# Patient Record
Sex: FEMALE | Race: WHITE | NOT HISPANIC OR LATINO | URBAN - METROPOLITAN AREA
[De-identification: names, ages, dates, MRNs, and addresses within clinical notes are randomized per-mention and may not be internally consistent; named-entity substitution may affect disease eponyms.]

---

## 2017-07-28 ENCOUNTER — APPOINTMENT (OUTPATIENT)
Dept: LACTATION | Facility: HOSPITAL | Age: 37
End: 2017-07-28

## 2023-05-03 ENCOUNTER — OFFICE VISIT (OUTPATIENT)
Dept: UROLOGY | Facility: CLINIC | Age: 43
End: 2023-05-03
Payer: COMMERCIAL

## 2023-05-03 DIAGNOSIS — N39.0 RECURRENT UTI: Primary | ICD-10-CM

## 2023-05-03 LAB
BILIRUB BLD-MCNC: NEGATIVE MG/DL
CLARITY, POC: CLEAR
COLOR UR: YELLOW
EXPIRATION DATE: NORMAL
GLUCOSE UR STRIP-MCNC: NEGATIVE MG/DL
KETONES UR QL: NEGATIVE
LEUKOCYTE EST, POC: NEGATIVE
Lab: NORMAL
NITRITE UR-MCNC: NEGATIVE MG/ML
PH UR: 6 [PH] (ref 5–8)
PROT UR STRIP-MCNC: NEGATIVE MG/DL
RBC # UR STRIP: NEGATIVE /UL
SP GR UR: 1.03 (ref 1–1.03)
UROBILINOGEN UR QL: NORMAL

## 2023-05-03 RX ORDER — METHENAMINE HIPPURATE 1000 MG/1
1 TABLET ORAL 2 TIMES DAILY WITH MEALS
Qty: 90 TABLET | Refills: 2 | Status: SHIPPED | OUTPATIENT
Start: 2023-05-03

## 2023-05-03 NOTE — PATIENT INSTRUCTIONS
METHANAMINE HIPPURATE FOR UTI PREVENTION - INFORMATION SHEET     WHAT ARE HIPREX® (methenamine hippurate tablets USP)?  Hiprex® (methenamine hippurate tablets USP) are used to prevent or control recurring urinary tract infections caused by certain bacteria. Hiprex is not used to treat an active infection; Hiprex should only be used after the infection has been treated with other antibiotics.    Hiprex is an antibiotic that stops the growth of bacteria in urine. Hiprex also contains an ingredient that helps to make the urine acidic. When the urine is acidic, Hiprex turns into formaldehyde to kill the bacteria.    You should not take Hiprex unless your doctor has confirmed that your infection is caused by bacteria that is susceptible to Hiprex. Hiprex is effective only against bacterial infections in the urinary tract. It will not work for other types of bacterial infections or for viral infections (such as the common cold or flu). Unnecessary use of Hiprex can lead to its decreased effectiveness and the development of antibiotic-resistant bacteria.    WHAT CONDITIONS ARE TREATED WITH HIPREX TABLETS?  Hiprex is used to prevent or control returning urinary tract infections caused by certain bacteria. It is not used to treat an active infection. Other antibiotics must be used first to treat and cure the infection.    WHAT IS HIPREX USED FOR?  Hiprex® (methenamine hippurate tablets USP) are used to prevent or control recurring urinary tract infections caused by certain bacteria. Hiprex is not used to treat an active infection; Hiprex should only be used after the infection has been treated with other antibiotics.    WHEN SHOULD I NOT TAKE THE DRUG?  You should not take Hiprex if you are allergic to methenamine hippurate, formaldehyde, or any of the other ingredients in the drug, or if you are taking a sulfonamide drug.    You should not take Hiprex unless your doctor has confirmed that your infection is caused by  bacteria that is susceptible to Hiprex. Hiprex is effective only against bacterial infections in the urinary tract. It will not work for other types of bacterial infections or for viral infections (such as the common cold or flu). Unnecessary use of Hiprex can lead to its decreased effectiveness and the development of antibiotic-resistant bacteria.    You should not take Hiprex if you have kidney failure, severe liver insufficiency, or you are severely dehydrated.    WHAT WARNINGS SHOULD I KNOW ABOUT HIPREX?  Large doses of Hiprex (8 pills/day for 3-4 weeks) can cause bladder irritation, painful and frequent urination, cause protein to leak into the urine, and cause blood in the urine.    You should also be aware that taking Hiprex before a bacterial infection has been confirmed increases the risk of developing drug resistant bacteria.    When taking Hiprex:    You should take every precaution to maintain an acid pH of your urine  If you have liver dysfunction, you should periodically have liver function tests  Safe use in early pregnancy has not been established. In the last trimester of your pregnancy, safety has been suggested, but has not been proven  Hiprex contains FD&C Yellow No. 5 (tartazine) dye, which can cause bronchial asthma  Hiprex should only be used to treat bacterial infections. Hiprex will not treat a viral infection (such as the common cold)

## 2023-05-03 NOTE — PROGRESS NOTES
"       Office Visit New Urology      Patient Name: Monica Condon  : 1980   MRN: [id number]     Chief Complaint:    Chief Complaint   Patient presents with   • Recurrent UTI       Referring Provider: Yasmin Ren AP*    History of Present Illness: Monica Condon is a 42 y.o. female who presents to Urology today for evaluation of recurrent UTIs. States when she first  had frequent UTIs with intercourse. Was placed on post coital abx.     Has been having intermittent UTIs, \"a few per year.\"    Minimal records were sent by her primary care provider's office, only note from 2022 at which point she had some dysuria and was diagnosed with UTI.    No other culture results are available.    E coli UTI in 2022.     States she is on antibiotic Bactrim for current UTI. Thinks E coli.     UTI symptoms include back pain and abdominal pain, denies dysuria, reports \"different smelling urine\".     On Metformin for weight loss, previously lost 80 lbs, recently put back on 15.     Drinks Medrano's coke daily.  Does not drink enough water likely.     Denies difficulty urinating.    Urinalysis entirely negative today.    Subjective      Review of System: Review of Systems   Genitourinary: Negative for decreased urine volume, difficulty urinating, dysuria, enuresis, flank pain, frequency, hematuria and urgency.      I have reviewed the ROS documented by my clinical staff, I have updated appropriately and I agree. Lamin Montoya MD    Past Medical History: History reviewed. No pertinent past medical history.    Past Surgical History: History reviewed. No pertinent surgical history.    Family History: History reviewed. No pertinent family history.    Social History:   Social History     Socioeconomic History   • Marital status:        Medications:     Current Outpatient Medications:   •  methenamine (HIPREX) 1 g tablet, Take 1 tablet by mouth 2 (Two) Times a Day With Meals., Disp: 90 " tablet, Rfl: 2    Allergies:   Not on File      Objective     Physical Exam:   Vital Signs: There were no vitals filed for this visit.  There is no height or weight on file to calculate BMI.     Physical Exam  Constitutional:       Appearance: Normal appearance.   HENT:      Head: Normocephalic and atraumatic.      Nose: Nose normal.      Mouth/Throat:      Mouth: Mucous membranes are moist.      Pharynx: Oropharynx is clear.   Eyes:      Extraocular Movements: Extraocular movements intact.      Pupils: Pupils are equal, round, and reactive to light.   Pulmonary:      Effort: Pulmonary effort is normal. No respiratory distress.   Musculoskeletal:         General: No swelling or deformity. Normal range of motion.      Cervical back: Normal range of motion and neck supple.   Skin:     General: Skin is warm and dry.   Neurological:      General: No focal deficit present.      Mental Status: She is alert and oriented to person, place, and time. Mental status is at baseline.   Psychiatric:         Mood and Affect: Mood normal.         Behavior: Behavior normal.         Labs:   Brief Urine Lab Results  (Last result in the past 365 days)      Color   Clarity   Blood   Leuk Est   Nitrite   Protein   CREAT   Urine HCG        05/03/23 1505 Yellow   Clear   Negative   Negative   Negative   Negative                      No results found for: GLUCOSE, CALCIUM, NA, K, CO2, CL, BUN, CREATININE, EGFRIFAFRI, EGFRIFNONA, BCR, ANIONGAP    No results found for: WBC, HGB, HCT, MCV, PLT    Images:   No Images in the past 120 days found..    Measures:   Tobacco:   Monica Condon  has no history on file for tobacco use.         Assessment / Plan      Assessment/Plan:   Monica Condon is a 42 y.o. female who presented today for recurrent urinary tract infection.  Risk factors include obesity and significant Coca-Cola intake daily.  He is to increase her water intake, perform timed double voiding, discussed UTI prevention.  Discussed  self start antibiotic therapy with a standing urine culture order, postcoital antibiotics if associated with intercourse or daily prevention to include antibiotic versus nonantibiotic prevention.  She is interested methenamine hippurate prevention daily.  Discussed using this with a vitamin C tablet for best results.  See her back in 3 months for evaluation.  Risks of medication discussed.    Diagnoses and all orders for this visit:    1. Recurrent UTI (Primary)  -     POC Urinalysis Dipstick, Automated  -     methenamine (HIPREX) 1 g tablet; Take 1 tablet by mouth 2 (Two) Times a Day With Meals.  Dispense: 90 tablet; Refill: 2            Follow Up:   Return in about 3 months (around 8/3/2023).    I spent approximately 30 minutes providing clinical care for this patient; including review of patient's chart and provider documentation, face to face time spent with patient in examination room (obtaining history, performing physical exam, discussing diagnosis and management options), placing orders, and completing patient documentation.     Lamin Montoya MD  [facility] [place]

## 2023-08-28 ENCOUNTER — OFFICE VISIT (OUTPATIENT)
Dept: UROLOGY | Facility: CLINIC | Age: 43
End: 2023-08-28
Payer: COMMERCIAL

## 2023-08-28 VITALS
HEIGHT: 68 IN | WEIGHT: 264 LBS | BODY MASS INDEX: 40.01 KG/M2 | OXYGEN SATURATION: 92 % | SYSTOLIC BLOOD PRESSURE: 126 MMHG | HEART RATE: 62 BPM | DIASTOLIC BLOOD PRESSURE: 78 MMHG

## 2023-08-28 DIAGNOSIS — N39.0 RECURRENT UTI: Primary | ICD-10-CM

## 2023-08-28 LAB
BILIRUB BLD-MCNC: NEGATIVE MG/DL
CLARITY, POC: CLEAR
COLOR UR: YELLOW
EXPIRATION DATE: NORMAL
GLUCOSE UR STRIP-MCNC: NEGATIVE MG/DL
KETONES UR QL: NEGATIVE
LEUKOCYTE EST, POC: NEGATIVE
Lab: NORMAL
NITRITE UR-MCNC: NEGATIVE MG/ML
PH UR: 6 [PH] (ref 5–8)
PROT UR STRIP-MCNC: NEGATIVE MG/DL
RBC # UR STRIP: NEGATIVE /UL
SP GR UR: 1.02 (ref 1–1.03)
UROBILINOGEN UR QL: NORMAL

## 2023-08-28 RX ORDER — CLEMASTINE FUMARATE 2.68 MG
2.68 TABLET ORAL DAILY
COMMUNITY

## 2023-08-28 RX ORDER — SERTRALINE HYDROCHLORIDE 100 MG/1
100 TABLET, FILM COATED ORAL
COMMUNITY

## 2023-08-28 RX ORDER — IBUPROFEN 800 MG/1
800 TABLET ORAL
COMMUNITY

## 2023-08-28 RX ORDER — FAMOTIDINE 20 MG/1
20 TABLET, FILM COATED ORAL
COMMUNITY

## 2023-08-28 RX ORDER — EPINEPHRINE 0.3 MG/.3ML
INJECTION SUBCUTANEOUS
COMMUNITY
Start: 2015-09-17

## 2023-08-28 RX ORDER — METOPROLOL SUCCINATE 100 MG/1
100 TABLET, EXTENDED RELEASE ORAL
COMMUNITY

## 2023-08-28 RX ORDER — DICYCLOMINE HYDROCHLORIDE 10 MG/1
10 CAPSULE ORAL
COMMUNITY

## 2023-08-28 RX ORDER — BUSPIRONE HYDROCHLORIDE 7.5 MG/1
TABLET ORAL
COMMUNITY
Start: 2023-08-25

## 2023-08-28 NOTE — PROGRESS NOTES
Follow Up Office Visit      Patient Name: Monica Condon  : 1980   MRN: [id number]     Chief Complaint:    Chief Complaint   Patient presents with    Recurrent UTI       Referring Provider: No ref. provider found    History of Present Illness: Monica Condon is a 42 y.o. female who presents today for follow up of recurrent UTI.  Last seen in May.  Placed on methenamine hippurate for concern for recurrent UTI.  Has been drinking 1 cup of coffee daily and 1 coca cola, large Medrano's cup daily.  She is trying to stop drinking soda but having trouble.  She has been on methenamine hippurate for the last 3 months with no UTI symptoms.  She denies side effects associated with the medication.  Today, she reports low pelvic pain but denies dysuria.  Her urinalysis is entirely negative.  Very mildly elevated 84 mL, she feels like she is emptying her bladder completely.      Subjective      Review of System: Review of Systems   Constitutional:  Positive for fatigue.   Cardiovascular:  Positive for palpitations.   Genitourinary:  Positive for frequency and urgency.    I have reviewed the ROS documented by my clinical staff, I have updated appropriately and I agree. Lamin Montoya MD    I have reviewed and the following portions of the patient's history were updated as appropriate: past family history, past medical history, past social history, past surgical history and problem list.    Medications:     Current Outpatient Medications:     busPIRone (BUSPAR) 7.5 MG tablet, , Disp: , Rfl:     clemastine (TAVIST) 2.68 MG tablet, Take 1 tablet by mouth Daily., Disp: , Rfl:     Diclofenac Sodium (VOLTAREN) 1 % gel gel, , Disp: , Rfl:     dicyclomine (BENTYL) 10 MG capsule, Take 1 capsule by mouth., Disp: , Rfl:     EPINEPHrine (EPIPEN) 0.3 MG/0.3ML solution auto-injector injection, EpiPen 2-Raciel 0.3 mg/0.3 mL injection, auto-injector  As Directed, Disp: , Rfl:     famotidine (PEPCID) 20 MG tablet, Take 1  "tablet by mouth., Disp: , Rfl:     ibuprofen (ADVIL,MOTRIN) 800 MG tablet, Take 1 tablet by mouth., Disp: , Rfl:     metFORMIN (GLUCOPHAGE) 1000 MG tablet, Take 1 tablet by mouth., Disp: , Rfl:     metoprolol succinate XL (TOPROL-XL) 100 MG 24 hr tablet, Take 1 tablet by mouth., Disp: , Rfl:     sertraline (ZOLOFT) 100 MG tablet, Take 1 tablet by mouth., Disp: , Rfl:     Allergies:   No Known Allergies    Bladder & Bowel Symptom Questionnaire    How often do you usually urinate during the day ?   1 - About every 3-4 hours   2.   How many timed do you urinate at night?   0 - No more often than once in 4 hours    3.   What is the reason that you usually urinate?   2 - About every 2-3 hours    4.   Once you get the urge to go, how long can you     comfortably delay?   2 - About every 2-3 hours    5.   How often do you get a sudden urge that makes you rush to the bathroom?   3 - About every 1-2 hours   6.   How often does a sudden urge to urinate result in you leaking urine or wetting pads?   1 - About every 3-4 hours   7.  In your opinion, how good is your bladder control?   2 - About every 2-3 hours    8.  Do you have accidental bowel leakage?   no   9.  Do you have difficulty fully emptying your bladder?   no   10.  Do you experience accidental leakage when performing some physical activity such as coughing, sneezing, laughing or exercise?   yes   11. Have you tried medications to help improve your symptoms?   no   12. Would you be interested in learning about a long-lasting option that may help you with your symptoms?   yes                                                                             Total Score   11     0-7 (Mild) 8-16 (Moderate) 17-28 (Severe)        Post void residual bladder scan:   84 mL     Objective     Physical Exam:   Vital Signs:   Vitals:    08/28/23 0932   BP: 126/78   Pulse: 62   SpO2: 92%   Weight: 120 kg (264 lb)   Height: 172.7 cm (68\")     Body mass index is 40.14 kg/mý.     Physical " "Exam    Labs:   Brief Urine Lab Results  (Last result in the past 365 days)        Color   Clarity   Blood   Leuk Est   Nitrite   Protein   CREAT   Urine HCG        08/28/23 0953 Yellow   Clear   Negative   Negative   Negative   Negative                        No results found for: GLUCOSE, CALCIUM, NA, K, CO2, CL, BUN, CREATININE, EGFRIFAFRI, EGFRIFNONA, BCR, ANIONGAP    No results found for: WBC, HGB, HCT, MCV, PLT    Images:   No Images in the past 120 days found..    Measures:   Tobacco:   Monica Condon  reports that she has never smoked. She does not have any smokeless tobacco history on file.    Assessment / Plan      Assessment/Plan:   42 y.o. female who presented today for follow up of recurrent UTI.  No UTI concerns in the last 3 months.  She completed 3 months of urinary sterilization.  She feels like she has UTI today, she also reports she has \"bulging disks in her neck and back\" and has been told that some of this pain in her pelvis may be a referred pain.  She denies dysuria.  We will send urine for resolve MDX urinary genetic testing, if positive for bacteria we will call her with antibiotic course.  Otherwise she does not need any further follow-up.  She needs to work on increasing her water intake, reducing her caffeine and soda intake, focusing on timed and double voiding to ensure emptying.  Follow-up as needed.    Diagnoses and all orders for this visit:    1. Recurrent UTI (Primary)  -     POC Urinalysis Dipstick, Automated           Follow Up:   Return if symptoms worsen or fail to improve.    I spent approximately 20 minutes providing clinical care for this patient; including review of patient's chart and provider documentation, face to face time spent with patient in examination room (obtaining history, performing physical exam, discussing diagnosis and management options), placing orders, and completing patient documentation.     Lamin Montoya MD  [facility] [place]   "

## 2023-09-01 NOTE — PROGRESS NOTES
Please let Monica know her urine culture/resolve MDX came back negative for any bacteria.  Thank you

## 2024-08-09 ENCOUNTER — TELEPHONE (OUTPATIENT)
Dept: UROLOGY | Facility: CLINIC | Age: 44
End: 2024-08-09
Payer: COMMERCIAL

## 2024-08-09 NOTE — TELEPHONE ENCOUNTER
HUB CALLED B/C PATIENT THINKS SHE HAS A UTI, EVEN THOUGH PRIMARY CARE SAID SHE DIDN'T AND DIDN'T FINISH THE MEDICATION. PATIENT MAY DROP OFF SAMPLE.

## 2024-08-13 RX ORDER — MECOBALAMIN 5000 MCG
TABLET,DISINTEGRATING ORAL 2 TIMES DAILY
COMMUNITY

## 2024-08-20 ENCOUNTER — OFFICE VISIT (OUTPATIENT)
Dept: BARIATRICS/WEIGHT MGMT | Facility: CLINIC | Age: 44
End: 2024-08-20
Payer: COMMERCIAL

## 2024-08-20 ENCOUNTER — DOCUMENTATION (OUTPATIENT)
Dept: BARIATRICS/WEIGHT MGMT | Facility: CLINIC | Age: 44
End: 2024-08-20
Payer: COMMERCIAL

## 2024-08-20 ENCOUNTER — OFFICE VISIT (OUTPATIENT)
Dept: BEHAVIORAL HEALTH | Facility: CLINIC | Age: 44
End: 2024-08-20
Payer: COMMERCIAL

## 2024-08-20 VITALS
HEART RATE: 67 BPM | OXYGEN SATURATION: 98 % | BODY MASS INDEX: 42.94 KG/M2 | TEMPERATURE: 97.8 F | RESPIRATION RATE: 16 BRPM | HEIGHT: 67 IN | SYSTOLIC BLOOD PRESSURE: 130 MMHG | WEIGHT: 273.6 LBS | DIASTOLIC BLOOD PRESSURE: 84 MMHG

## 2024-08-20 DIAGNOSIS — E66.01 MORBID OBESITY WITH BMI OF 40.0-44.9, ADULT: Primary | ICD-10-CM

## 2024-08-20 DIAGNOSIS — I10 HYPERTENSION, UNSPECIFIED TYPE: ICD-10-CM

## 2024-08-20 DIAGNOSIS — Z71.89 ENCOUNTER FOR PSYCHOLOGICAL ASSESSMENT PRIOR TO BARIATRIC SURGERY: ICD-10-CM

## 2024-08-20 DIAGNOSIS — E66.01 OBESITY, CLASS III, BMI 40-49.9 (MORBID OBESITY): Primary | ICD-10-CM

## 2024-08-20 DIAGNOSIS — Z86.59 HISTORY OF ANXIETY: ICD-10-CM

## 2024-08-20 DIAGNOSIS — K21.9 GASTROESOPHAGEAL REFLUX DISEASE, UNSPECIFIED WHETHER ESOPHAGITIS PRESENT: ICD-10-CM

## 2024-08-20 DIAGNOSIS — K82.9 GALLBLADDER DISEASE: ICD-10-CM

## 2024-08-20 DIAGNOSIS — R53.83 FATIGUE, UNSPECIFIED TYPE: ICD-10-CM

## 2024-08-20 PROCEDURE — 1159F MED LIST DOCD IN RCRD: CPT | Performed by: PSYCHOLOGIST

## 2024-08-20 PROCEDURE — 1160F RVW MEDS BY RX/DR IN RCRD: CPT | Performed by: PSYCHOLOGIST

## 2024-08-20 PROCEDURE — 90791 PSYCH DIAGNOSTIC EVALUATION: CPT | Performed by: PSYCHOLOGIST

## 2024-08-20 PROCEDURE — 3079F DIAST BP 80-89 MM HG: CPT | Performed by: PHYSICIAN ASSISTANT

## 2024-08-20 PROCEDURE — 99204 OFFICE O/P NEW MOD 45 MIN: CPT | Performed by: PHYSICIAN ASSISTANT

## 2024-08-20 PROCEDURE — 3075F SYST BP GE 130 - 139MM HG: CPT | Performed by: PHYSICIAN ASSISTANT

## 2024-08-20 RX ORDER — METFORMIN HYDROCHLORIDE 500 MG/1
500 TABLET, EXTENDED RELEASE ORAL
COMMUNITY

## 2024-08-20 RX ORDER — POTASSIUM CHLORIDE 750 MG/1
10 TABLET, FILM COATED, EXTENDED RELEASE ORAL DAILY
COMMUNITY

## 2024-08-20 RX ORDER — MULTIPLE VITAMINS W/ MINERALS TAB 9MG-400MCG
2 TAB ORAL DAILY
COMMUNITY

## 2024-08-20 NOTE — PROGRESS NOTES
PROGRESS NOTE    Data:    Monica Condon is a 43 y.o. female who met with the undersigned for a scheduled psychological evaluation from 11:15 am - 12:00 pm.      Clinical Maneuvering/Intervention:      Chief complaint and history of presenting illness/Problems: struggling with obesity for several years. Despite trying different weight loss plans and diets, the pt reported being unsuccessful in losing weight. A psychological evaluation was conducted in order to assess past and current level of functioning. Areas assessed included, but were not limited to: perception of social support, perception of ability to face and deal with challenges in life (positive functioning), anxiety symptoms, depressive symptoms, perspective on beliefs/belief system, coping skills for stress, intelligence level, addiction issues, etc. Therapeutic rapport was established. Interventions conducted today were geared towards assessing the pt's readiness for weight loss surgery and identifying and psychological contraindications for undergoing such a major life change. Social support was deemed strong (specific to weight loss surgery/weight loss in this manner and in a general sense): partner, friends, family members.Current psychological struggles were described as low, but included anxiety (per pt, well-managed with medication) and frustrations with being overweight. Coping skills for distress and related to undergoing a major life change such as weight loss surgery/weight loss were deemed strong and included choosing to see good in life,  finds humor in things, makes a point to do quality work, tends to be a responsible person, maintains quality relationships with others, and makes a point to learn what will help her be successful with weight loss surgery. The pt endorsed having characteristics of readiness to undergo major life changes inherent in the journey of weight loss surgery. She could speak to having 'suffered enough,' and the  decision to have weight loss surgery has 'clicked' for her. The pt expressed gratitude for today's visit.     Past Family and Social History:      History of family mental health problems: none endorsed    Psychosocial history: treatment of psychiatric care in the past (N/A), alcohol/substance abuse treatment in the past (N/A) , alcohol/substance abuse problems (N/A), inpatient psychiatric care (N/A).    Mental Status Exam (MSE):  Hygiene:  good  Dress: normal  Attitude:  cooperative and proactive  Motor Activity: normal  Speech: normal  Mood:   excited  Affect:  congruent  Thought Processes: normal  Thought Content:  normal  Suicidal Thoughts:  not endorsed  Homicidal Thoughts:  not endorsed  Crisis Safety Plan: not needed   Hallucinations:  none      Patient's Support Network Includes:  family, friends      Progress toward goal: there is evidence to suggest that she is taking measures to improve the quality of her life including seeking weight loss surgery      Functional Status: high      Prognosis: good with weight loss surgery    Evaluation, Diagnoses, and Ability/Capacity to Respond to Treatment:      The pt presented to be struggling with frustrations with being overweight (BMI = 43.50, morbid obesity). She has a history of anxiety problem, but medication seems quite helpful in managing symptoms. The pt presented with good mental health; results of MSE demonstrated a functional status of high. Strengths: belief in self that she will be successful with weight loss surgery, etc (see detailed list of coping skills above). Needed for growth (CPT code requirement for Weaknesses): weight loss.      From a psychological standpoint, the pt presents as a good candidate for bariatric surgery. She is motivated for the surgery, has showed readiness for the lifestyle change in terms of starting to adjust her eating habits, and seems to have appropriate expectations of how to prepare and how to live after surgery in order  to lose weight successfully.    Treatment Plan:      Short term goals: Start improving her health by following up with her bariatric surgeon in order to receive weight loss surgery as soon as feasible/appropriate and demonstrate success with compliance to adhering to the recommended diet. Long term goals: reach a healthy weight and experience overall improved mood via taking control over her health.    Karla Buck, PhD, LP

## 2024-08-20 NOTE — PROGRESS NOTES
[facility] BARIATRIC SURGERY  [address]    [facility] [address]  [phone]      Patient  Name:  Monica Condon  :  1980      Date of Visit: 2024      Chief Complaint:  weight gain; unable to maintain weight loss      History of Present Illness:  Monica Condon is a 43 y.o. female who presents today for evaluation, education and consultation regarding metabolic and bariatric surgery with Dr. Kendall.     Monica has been overweight for at least 30 years, has been 35 pounds or more overweight for at least 30 years, has been 100 pounds or more overweight for 25 or more years and started dieting at age 16.  Previous diet attempts include: High Protein, Low Carbohydrate, Low Fat, Calorie Counting, Venkatesh's Diet, [place], Cabbage Soup, Fasting, and Slim Fast; [place] Weight Loss and Weight Watchers.  The most weight Monica lost was 80 pounds but was unable to maintain that weight loss.  Her maximum lifetime weight is 330 pounds.       Complete history has been obtained and discussed today, as pertinent to metabolic/ bariatric surgery.     Past Medical History:   Diagnosis Date    Anxiety     Dyspepsia     Dysphagia     Fatigue     Frequent UTI     pending Urology eval    Gallbladder disease     abnormal imaging    GERD (gastroesophageal reflux disease)     controlled w/ daily PPI + Pepcid, denies recent eval    Hypertension     IBS (irritable bowel syndrome)     Joint pain     Morbid obesity      Past Surgical History:   Procedure Laterality Date    COLONOSCOPY  2017    DILATATION AND CURETTAGE  2016       Allergies   Allergen Reactions    Lidocaine Swelling    Shellfish-Derived Products Swelling       Current Outpatient Medications:     busPIRone (BUSPAR) 7.5 MG tablet, Take 1 tablet by mouth 2 (Two) Times a Day., Disp: , Rfl:     clemastine (TAVIST) 2.68 MG tablet, Take 1 tablet by mouth Daily., Disp: , Rfl:     dicyclomine (BENTYL) 10 MG capsule, Take 1  capsule by mouth 2 (Two) Times a Day., Disp: , Rfl:     famotidine (PEPCID) 40 MG tablet, Take 0.5 tablets by mouth 2 (Two) Times a Day., Disp: , Rfl:     ibuprofen (ADVIL,MOTRIN) 800 MG tablet, Take 1 tablet by mouth 2 (Two) Times a Day., Disp: , Rfl:     lansoprazole (PREVACID) 30 MG capsule, Take 1 capsule by mouth 2 (Two) Times a Day., Disp: , Rfl:     metFORMIN ER (GLUCOPHAGE-XR) 500 MG 24 hr tablet, Take 1 tablet by mouth Daily With Breakfast., Disp: , Rfl:     metoprolol succinate XL (TOPROL-XL) 100 MG 24 hr tablet, Take 1 tablet by mouth 2 (Two) Times a Day., Disp: , Rfl:     multivitamin with minerals tablet tablet, Take 2 tablets by mouth Daily., Disp: , Rfl:     potassium chloride 10 MEQ CR tablet, Take 1 tablet by mouth Daily., Disp: , Rfl:     sertraline (ZOLOFT) 100 MG tablet, Take 2 tablets by mouth 2 (Two) Times a Day., Disp: , Rfl:     EPINEPHrine (EPIPEN) 0.3 MG/0.3ML solution auto-injector injection, EpiPen 2-Raciel 0.3 mg/0.3 mL injection, auto-injector  As Directed (Patient not taking: Reported on 8/20/2024), Disp: , Rfl:     Social History     Socioeconomic History    Marital status:    Tobacco Use    Smoking status: Never   Vaping Use    Vaping status: Never Used   Substance and Sexual Activity    Alcohol use: Not Currently    Drug use: Never    Sexual activity: Yes     Social History     Social History Narrative    Lives in [place], [place] w/ partner and 5 children.  Self-employed, cleaning business.       Family History   Problem Relation Age of Onset    Diabetes Mother 70    Hypertension Mother 60    Heart attack Mother 80    Diabetes Father 60    Hypertension Father 60    Heart attack Father 75    Obesity Sister     Diabetes Sister     Lung cancer Maternal Grandfather        Review of Systems:  Constitutional:  reports fatigue, weight gain and denies fevers, chills.  HEENT:  denies headache, ear pain or loss of hearing, blurred or double vision, nasal discharge or sore  throat.  Cardiovascular:  reports HTN and denies hx heart disease, chest pain, hx DVT.  Respiratory:  denies cough , wheezing, sleep apnea, asthma, hx PE.  Gastrointestinal:  reports dysphagia, heartburn, IBS, gallbladder issues (abnormal imaging) and denies nausea, vomiting, liver disease.  Genitourinary:   denies incontinence, hematuria, dysuria, polyuria, polydipsia, renal insufficiency.    Musculoskeletal:   denies fibromyalgia, arthritis, and autoimmune disease.  Neurological:  denies numbness /tingling, dizziness, confusion, seizure.  Psychiatric:  reports hx anxiety and denies depressed mood, bipolar disorder.  Endocrine:   denies diabetes, thyroid disease.  Hematologic:   denies bruising, bleeding disorder, hx anemia, hx blood transfusion.  Skin:   denies rashes, hx MRSA.    Physical Exam:  Vital Signs:  Weight: 124 kg (273 lb 9.6 oz)   Body mass index is 43.5 kg/m².  Temp: 97.8 °F (36.6 °C)   Heart Rate: 67   BP: 130/84     Physical Exam  Vitals reviewed.   Constitutional:       Appearance: She is well-developed.   HENT:      Head: Normocephalic and atraumatic.   Eyes:      General: No scleral icterus.     Conjunctiva/sclera: Conjunctivae normal.   Neck:      Thyroid: No thyromegaly.   Cardiovascular:      Rate and Rhythm: Normal rate and regular rhythm.      Heart sounds: No murmur heard.  Pulmonary:      Effort: Pulmonary effort is normal. No respiratory distress.      Breath sounds: Normal breath sounds. No wheezing or rales.   Abdominal:      General: Bowel sounds are normal. There is no distension.      Palpations: Abdomen is soft. There is no mass.      Tenderness: There is no abdominal tenderness.      Hernia: No hernia is present.      Comments: no surgical scars   Musculoskeletal:         General: Normal range of motion.      Cervical back: Neck supple.   Skin:     General: Skin is warm and dry.      Findings: No rash.   Neurological:      Mental Status: She is alert and oriented to person, place,  and time.      Gait: Gait normal.   Psychiatric:         Judgment: Judgment normal.         Patient Active Problem List   Diagnosis    IBS (irritable bowel syndrome)    Morbid obesity    Fatigue    Dyspepsia    Anxiety    Hypertension    GERD (gastroesophageal reflux disease)    Joint pain    Dysphagia    Frequent UTI    Gallbladder disease       Assessment:  43 y.o. female with medically complicated obesity pursuing sleeve gastrectomy.    Metabolic & Bariatric Surgery is deemed medically necessary given the following: Class 3 Severe Obesity (BMI >=40). Obesity-related health conditions include the following: hypertension and GERD. Obesity is worsening. BMI is is above average; BMI management plan is completed. We discussed consulting a Bariatric surgeon.        Plan:  Further evaluation will include: CBC, CMP, Lipids, TSH, HgA1C, H.Pylori, Gallbladder Eval, and EGD.    No additional clearances needed prior to surgery at this time.     Patient understands that bariatric surgery is not cosmetic surgery but rather a tool to help make a lifelong commitment to lifestyle changes including diet, exercise and behavior modifications.  The patient has been educated today on those expected postoperative lifestyle changes.  Psychological and Nutritional consultations will be completed prior to surgery.  Instructions on how to access [employer] (an internet based site w/ educational surgical videos) were given to the patient.  Recommended perioperative vitamin supplementation was reviewed.  The importance of avoiding ASA/ NSAIDS/ steroids/ tobacco/nicotine/ hormones/ immunomodulators perioperatively was discussed in detail.  All questions/concerns have been addressed.      Further input to follow pending the above.           KOURTNEY Soares

## 2024-08-20 NOTE — PROGRESS NOTES
"Weight Loss Surgery  Presurgical Nutrition Assessment     Monica Condon  08/20/2024  [id number]  [id number]  1980   female    Surgery desired: LSG (sleeve gastrectomy)     HEIGHT: 168.9 cm (66.5\")   WEIGHT: 124 kg (273.5 #)   BMI: 43.5    Highest weight ever: 150 kg (330 #)      Allergies   Allergen Reactions    Lidocaine Swelling    Shellfish-Derived Products Swelling       Current Outpatient Medications:     busPIRone (BUSPAR) 7.5 MG tablet, Take 1 tablet by mouth 2 (Two) Times a Day., Disp: , Rfl:     clemastine (TAVIST) 2.68 MG tablet, Take 1 tablet by mouth Daily., Disp: , Rfl:     dicyclomine (BENTYL) 10 MG capsule, Take 1 capsule by mouth 2 (Two) Times a Day., Disp: , Rfl:     EPINEPHrine (EPIPEN) 0.3 MG/0.3ML solution auto-injector injection, EpiPen 2-Raciel 0.3 mg/0.3 mL injection, auto-injector  As Directed (Patient not taking: Reported on 8/20/2024), Disp: , Rfl:     famotidine (PEPCID) 40 MG tablet, Take 0.5 tablets by mouth 2 (Two) Times a Day., Disp: , Rfl:     ibuprofen (ADVIL,MOTRIN) 800 MG tablet, Take 1 tablet by mouth 2 (Two) Times a Day., Disp: , Rfl:     lansoprazole (PREVACID) 30 MG capsule, Take 1 capsule by mouth 2 (Two) Times a Day., Disp: , Rfl:     metFORMIN ER (GLUCOPHAGE-XR) 500 MG 24 hr tablet, Take 1 tablet by mouth Daily With Breakfast., Disp: , Rfl:     metoprolol succinate XL (TOPROL-XL) 100 MG 24 hr tablet, Take 1 tablet by mouth 2 (Two) Times a Day., Disp: , Rfl:     multivitamin with minerals tablet tablet, Take 2 tablets by mouth Daily., Disp: , Rfl:     potassium chloride 10 MEQ CR tablet, Take 1 tablet by mouth Daily., Disp: , Rfl:     sertraline (ZOLOFT) 100 MG tablet, Take 2 tablets by mouth 2 (Two) Times a Day., Disp: , Rfl:       Subjective information: Met with patient and her friend for nutrition consult. Patient participated fully in WW in the past and capably states principles of healthy eating for weight loss.  Premier protein beverage in the refrigerator " "purchased for son - patient rarely drinks.       Nutrition Recall   Example of Usual 24 hour intake:     Breakfast: Starts work at 8:30 to  9 - Eats nothing OR eats prior to that: bagel with butter OR granola with Greek yogurt. + 2 cups coffee with sugar-sweetened creamer (will change to sugar free creamer, she states)    Lunch: Works straight through until around 1 pm - often eats a fast food meal    Dinner: taco soup, for example. Prepares two meals - one for her and often a separate one for her children    Snacks:cookies is her favorite snack, also banana or chips. Snacks in the evening before TV but does not awaken to eat a meal or a snack at night after bedtime    Beverages of Choice: [employer] regular fountain Coke, purchased early before work and sipped on throughout the morning until lunch; as above, 2 cups coffee with sugar sweetened creamer.    Food Allergies or Intolerances: shellfish          Exercise / Activity: Patient is active in her work - states she is \"always sweating\" during work hours. Formerly involved in and loved \"cardio drumming\" and plans to restart.       Assessment of Nutritional Adequacy, Excessive Intake or Deficiencies:        Protein intake is insufficient to ensure successful weight loss.  Too few eating episodes, too little protein at the times patient does eat.                                        Processed / simple Carbohydrate intake is: high - fast food sides and regular soda, cookies et al                                       Balance of diet with a variety of fruits and vegetables is: poor - few vegetables and fruits                                                       Reliance on restaurant food including fast food is: high - especially at lunch                                                                          Ingestion of sweet beverages eg soda, sweet tea, fruit juice: high amounts, especially fountain Cokes regular      Education    Provided Nutrition Guidelines " for Bariatric and Metabolic Surgery   Reviewed guidelines for higher protein, limited carbohydrate diet to promote weight loss. Demonstrated means of counting protein and carbohydrate grams.   Educated patient to wisely choose an appropriate protein supplement beverage for the post-surgery liquid diet.  Provided product guidelines and examples.    Explained importance of goal setting to help in changing eating behaviors that are not conducive to weight loss.  Specific macronutrient goals as below.   Provided follow-up options for support, including contact information for dietitians here.     Discussed importance of tracking grams of protein and carbohydrate in diet.  Web-based support information and apps for smart phones and computers given.        Nutrition Goals   Protein goal: 100 grams per day in three regular balanced meals and two to three high protein snacks each day, to ensure desired weight loss.   Carbohydrate goal:  100-140 grams per day  Beverage goal: Appropriate non-carbonated beverage intake.  Patient to wean self off of any sweet beverages, including soda.    Exercise Goals  Continue current exercise routine, if appropriate, and obtain approval from caregiver if physically limited for any reason.   Start activity plan per PCP/specialist advice if not currently exercising.     Recommend that team proceed with surgery and follow per protocol.   Bonnie Sarmiento RD  08/20/2024  12:31 EDT

## 2024-08-21 LAB
ALBUMIN SERPL-MCNC: 4.1 G/DL (ref 3.9–4.9)
ALP SERPL-CCNC: 70 IU/L (ref 44–121)
ALT SERPL-CCNC: 19 IU/L (ref 0–32)
AST SERPL-CCNC: 17 IU/L (ref 0–40)
BASOPHILS # BLD AUTO: 0 X10E3/UL (ref 0–0.2)
BASOPHILS NFR BLD AUTO: 0 %
BILIRUB SERPL-MCNC: 0.3 MG/DL (ref 0–1.2)
BUN SERPL-MCNC: 21 MG/DL (ref 6–24)
BUN/CREAT SERPL: 28 (ref 9–23)
CALCIUM SERPL-MCNC: 9.2 MG/DL (ref 8.7–10.2)
CHLORIDE SERPL-SCNC: 104 MMOL/L (ref 96–106)
CHOLEST SERPL-MCNC: 215 MG/DL (ref 100–199)
CO2 SERPL-SCNC: 21 MMOL/L (ref 20–29)
CREAT SERPL-MCNC: 0.76 MG/DL (ref 0.57–1)
EGFRCR SERPLBLD CKD-EPI 2021: 99 ML/MIN/1.73
EOSINOPHIL # BLD AUTO: 0.4 X10E3/UL (ref 0–0.4)
EOSINOPHIL NFR BLD AUTO: 4 %
ERYTHROCYTE [DISTWIDTH] IN BLOOD BY AUTOMATED COUNT: 12.8 % (ref 11.7–15.4)
GLOBULIN SER CALC-MCNC: 2.7 G/DL (ref 1.5–4.5)
GLUCOSE SERPL-MCNC: 88 MG/DL (ref 70–99)
HBA1C MFR BLD: 5.5 % (ref 4.8–5.6)
HCT VFR BLD AUTO: 39.4 % (ref 34–46.6)
HDLC SERPL-MCNC: 48 MG/DL
HGB BLD-MCNC: 13 G/DL (ref 11.1–15.9)
IMM GRANULOCYTES # BLD AUTO: 0.1 X10E3/UL (ref 0–0.1)
IMM GRANULOCYTES NFR BLD AUTO: 1 %
LDLC SERPL CALC-MCNC: 116 MG/DL (ref 0–99)
LYMPHOCYTES # BLD AUTO: 3.1 X10E3/UL (ref 0.7–3.1)
LYMPHOCYTES NFR BLD AUTO: 34 %
MCH RBC QN AUTO: 30.5 PG (ref 26.6–33)
MCHC RBC AUTO-ENTMCNC: 33 G/DL (ref 31.5–35.7)
MCV RBC AUTO: 93 FL (ref 79–97)
MONOCYTES # BLD AUTO: 0.6 X10E3/UL (ref 0.1–0.9)
MONOCYTES NFR BLD AUTO: 7 %
NEUTROPHILS # BLD AUTO: 5 X10E3/UL (ref 1.4–7)
NEUTROPHILS NFR BLD AUTO: 54 %
PLATELET # BLD AUTO: 234 X10E3/UL (ref 150–450)
POTASSIUM SERPL-SCNC: 4.7 MMOL/L (ref 3.5–5.2)
PROT SERPL-MCNC: 6.8 G/DL (ref 6–8.5)
RBC # BLD AUTO: 4.26 X10E6/UL (ref 3.77–5.28)
SODIUM SERPL-SCNC: 138 MMOL/L (ref 134–144)
TRIGL SERPL-MCNC: 297 MG/DL (ref 0–149)
TSH SERPL DL<=0.005 MIU/L-ACNC: 2.61 UIU/ML (ref 0.45–4.5)
UREA BREATH TEST QL: NEGATIVE
VLDLC SERPL CALC-MCNC: 51 MG/DL (ref 5–40)
WBC # BLD AUTO: 9.3 X10E3/UL (ref 3.4–10.8)

## 2024-09-03 ENCOUNTER — TELEMEDICINE (OUTPATIENT)
Dept: BARIATRICS/WEIGHT MGMT | Facility: CLINIC | Age: 44
End: 2024-09-03
Payer: COMMERCIAL

## 2024-09-03 VITALS — WEIGHT: 280 LBS | HEIGHT: 67 IN | BODY MASS INDEX: 43.95 KG/M2

## 2024-09-03 DIAGNOSIS — K21.9 GASTROESOPHAGEAL REFLUX DISEASE, UNSPECIFIED WHETHER ESOPHAGITIS PRESENT: Primary | ICD-10-CM

## 2024-09-03 PROCEDURE — 1160F RVW MEDS BY RX/DR IN RCRD: CPT | Performed by: PHYSICIAN ASSISTANT

## 2024-09-03 PROCEDURE — 99214 OFFICE O/P EST MOD 30 MIN: CPT | Performed by: PHYSICIAN ASSISTANT

## 2024-09-03 PROCEDURE — 1159F MED LIST DOCD IN RCRD: CPT | Performed by: PHYSICIAN ASSISTANT

## 2024-09-03 RX ORDER — FLUTICASONE PROPIONATE 50 MCG
SPRAY, SUSPENSION (ML) NASAL
COMMUNITY
Start: 2024-08-26

## 2024-09-03 NOTE — PROGRESS NOTES
[facility] BARIATRIC SURGERY  [address]    [facility] [address] [phone]      Patient  Name:  Monica Condon  :  1980      Date of Visit: 2024      Chief Complaint:  weight gain; unable to maintain weight loss; GERD      History of Present Illness:  Monica Condon is a 43 y.o. female pursuing MBS w/ Dr. Kendall.     Monica has been overweight for at least 30 years, has been 35 pounds or more overweight for at least 30 years, has been 100 pounds or more overweight for 25 or more years and started dieting at age 16.  Previous diet attempts include: High Protein, Low Carbohydrate, Low Fat, Calorie Counting, Venkatesh's Diet, [place], Cabbage Soup, Fasting, and Slim Fast; [place] Weight Loss and Weight Watchers.  The most weight Monica lost was 80 pounds but was unable to maintain that weight loss.  Her maximum lifetime weight is 330 pounds.       Complete history has been obtained and discussed today, as pertinent to metabolic/ bariatric surgery.     Past Medical History:   Diagnosis Date    Anxiety     Dyspepsia     Dysphagia     Fatigue     Frequent UTI     pending Urology eval    Gallbladder disease     abnormal imaging    GERD (gastroesophageal reflux disease)     controlled w/ daily PPI + Pepcid, denies recent eval    Hypertension     IBS (irritable bowel syndrome)     Joint pain     Morbid obesity      Past Surgical History:   Procedure Laterality Date    COLONOSCOPY  2017    DILATATION AND CURETTAGE  2016       Allergies   Allergen Reactions    Lidocaine Swelling    Shellfish-Derived Products Swelling       Current Outpatient Medications:     busPIRone (BUSPAR) 7.5 MG tablet, Take 1 tablet by mouth 2 (Two) Times a Day., Disp: , Rfl:     clemastine (TAVIST) 2.68 MG tablet, Take 1 tablet by mouth Daily., Disp: , Rfl:     dicyclomine (BENTYL) 10 MG capsule, Take 1 capsule by mouth 2 (Two) Times a Day., Disp: , Rfl:     EPINEPHrine (EPIPEN) 0.3 MG/0.3ML  solution auto-injector injection, , Disp: , Rfl:     famotidine (PEPCID) 40 MG tablet, Take 0.5 tablets by mouth 2 (Two) Times a Day., Disp: , Rfl:     fluticasone (FLONASE) 50 MCG/ACT nasal spray, , Disp: , Rfl:     ibuprofen (ADVIL,MOTRIN) 800 MG tablet, Take 1 tablet by mouth 2 (Two) Times a Day., Disp: , Rfl:     lansoprazole (PREVACID) 30 MG capsule, Take 1 capsule by mouth 2 (Two) Times a Day., Disp: , Rfl:     metFORMIN ER (GLUCOPHAGE-XR) 500 MG 24 hr tablet, Take 1 tablet by mouth Daily With Breakfast., Disp: , Rfl:     metoprolol succinate XL (TOPROL-XL) 100 MG 24 hr tablet, Take 1 tablet by mouth 2 (Two) Times a Day., Disp: , Rfl:     multivitamin with minerals tablet tablet, Take 2 tablets by mouth Daily., Disp: , Rfl:     potassium chloride 10 MEQ CR tablet, Take 1 tablet by mouth Daily., Disp: , Rfl:     sertraline (ZOLOFT) 100 MG tablet, Take 2 tablets by mouth 2 (Two) Times a Day., Disp: , Rfl:     Social History     Socioeconomic History    Marital status:    Tobacco Use    Smoking status: Never   Vaping Use    Vaping status: Never Used   Substance and Sexual Activity    Alcohol use: Not Currently    Drug use: Never    Sexual activity: Yes     Social History     Social History Narrative    Lives in [place], [place] w/ partner and 5 children.  Self-employed, cleaning business.       Family History   Problem Relation Age of Onset    Diabetes Mother 70    Hypertension Mother 60    Heart attack Mother 80    Diabetes Father 60    Hypertension Father 60    Heart attack Father 75    Obesity Sister     Diabetes Sister     Lung cancer Maternal Grandfather        Review of Systems:  Constitutional:  reports fatigue, weight gain and denies fevers, chills.  HEENT:  denies headache, ear pain or loss of hearing, blurred or double vision, nasal discharge or sore throat.  Cardiovascular:  reports HTN and denies hx heart disease, chest pain, hx DVT.  Respiratory:  denies cough , wheezing, sleep apnea, asthma,  hx PE.  Gastrointestinal:  reports dysphagia, heartburn, IBS, gallbladder issues (abnormal imaging) and denies nausea, vomiting, liver disease.  Genitourinary:   denies incontinence, hematuria, dysuria, polyuria, polydipsia, renal insufficiency.    Musculoskeletal:   denies fibromyalgia, arthritis, and autoimmune disease.  Neurological:  denies numbness /tingling, dizziness, confusion, seizure.  Psychiatric:  reports hx anxiety and denies depressed mood, bipolar disorder.  Endocrine:   denies diabetes, thyroid disease.  Hematologic:   denies bruising, bleeding disorder, hx anemia, hx blood transfusion.  Skin:   denies rashes, hx MRSA.    Physical Exam:  Vital Signs:  Weight: 127 kg (280 lb)   Body mass index is 44.52 kg/m².              From Intake Eval 8/20/24:   Physical Exam  Vitals reviewed.   Constitutional:       Appearance: She is well-developed.   HENT:      Head: Normocephalic and atraumatic.   Eyes:      General: No scleral icterus.     Conjunctiva/sclera: Conjunctivae normal.   Neck:      Thyroid: No thyromegaly.   Cardiovascular:      Rate and Rhythm: Normal rate and regular rhythm.      Heart sounds: No murmur heard.  Pulmonary:      Effort: Pulmonary effort is normal. No respiratory distress.      Breath sounds: Normal breath sounds. No wheezing or rales.   Abdominal:      General: Bowel sounds are normal. There is no distension.      Palpations: Abdomen is soft. There is no mass.      Tenderness: There is no abdominal tenderness.      Hernia: No hernia is present.      Comments: no surgical scars   Musculoskeletal:         General: Normal range of motion.      Cervical back: Neck supple.   Skin:     General: Skin is warm and dry.      Findings: No rash.   Neurological:      Mental Status: She is alert and oriented to person, place, and time.      Gait: Gait normal.   Psychiatric:         Judgment: Judgment normal.         Patient Active Problem List   Diagnosis    IBS (irritable bowel syndrome)     Morbid obesity    Fatigue    Dyspepsia    Anxiety    Hypertension    GERD (gastroesophageal reflux disease)    Joint pain    Dysphagia    Frequent UTI    Gallbladder disease       Assessment:  43 y.o. female with medically complicated obesity pursuing sleeve gastrectomy.    Metabolic & Bariatric Surgery is deemed medically necessary given the following: Class 3 Severe Obesity (BMI >=40). Obesity-related health conditions include the following: hypertension and GERD. Obesity is worsening. BMI is is above average; BMI management plan is completed. We discussed consulting a Bariatric surgeon.        Plan:  Will schedule EGD @[facility] w/ Dr. Kendall.  Additional input to follow.           KOURTNEY Soares          Note: This was an audio and video enabled telemedicine encounter conducted via [employer].  Patient is located in [place].  Provider is at the office. Consent was obtained prior to the visit.

## 2024-09-12 ENCOUNTER — PATIENT ROUNDING (BHMG ONLY) (OUTPATIENT)
Dept: BARIATRICS/WEIGHT MGMT | Facility: CLINIC | Age: 44
End: 2024-09-12
Payer: COMMERCIAL

## 2024-09-12 NOTE — PROGRESS NOTES
A [employer] message has been sent to the patient for PATIENT ROUNDING for [facility] - Bariatric Surgery/[facility] Medical Weight Mgmt.

## 2024-10-11 ENCOUNTER — TELEMEDICINE (OUTPATIENT)
Dept: BARIATRICS/WEIGHT MGMT | Facility: CLINIC | Age: 44
End: 2024-10-11
Payer: COMMERCIAL

## 2024-10-11 VITALS — BODY MASS INDEX: 43.88 KG/M2 | WEIGHT: 276 LBS

## 2024-10-11 DIAGNOSIS — K21.9 GASTROESOPHAGEAL REFLUX DISEASE, UNSPECIFIED WHETHER ESOPHAGITIS PRESENT: ICD-10-CM

## 2024-10-11 DIAGNOSIS — E66.01 OBESITY, CLASS III, BMI 40-49.9 (MORBID OBESITY): Primary | ICD-10-CM

## 2024-10-11 RX ORDER — DICLOFENAC SODIUM 75 MG/1
75 TABLET, DELAYED RELEASE ORAL 2 TIMES DAILY
COMMUNITY
Start: 2024-09-19

## 2024-10-11 NOTE — PROGRESS NOTES
[facility]  [address]    [facility] [id number] [phone]      Patient Name:  Monica Condon  :  1980      Date of Visit:  10/11/2024      Reason for Visit:  Monthly Diet Visit #2       HPI:  Presents for supervised diet visit.  Pursuing metabolic and bariatric surgery w/ me.    Hx GERD on PPI and H2 blocker.  HP negative  GLP-1: no.  Since LOV, has gone from ibuprofen to diclofenac for back pain.    Denies change to med hx.    Denies any medical issues since last visit.     Reports a stressful past few weeks.  Due to stress, has overall been eating less.  Skips meals.  Doesn't eat all day, has a big meal at night.    Initial weight: 280 lbs.  Current weight:  276 lbs.    Past Medical History:   Diagnosis Date    Anxiety     Dyspepsia     Dysphagia     Fatigue     Frequent UTI     pending Urology eval    Gallbladder disease     abnormal imaging    GERD (gastroesophageal reflux disease)     controlled w/ daily PPI + Pepcid, denies recent eval    Hypertension     IBS (irritable bowel syndrome)     Joint pain     Morbid obesity      Past Surgical History:   Procedure Laterality Date    COLONOSCOPY  2017    DILATATION AND CURETTAGE  2016       Allergies   Allergen Reactions    Lidocaine Swelling    Shellfish-Derived Products Swelling         Current Outpatient Medications:     busPIRone (BUSPAR) 7.5 MG tablet, Take 1 tablet by mouth 2 (Two) Times a Day., Disp: , Rfl:     clemastine (TAVIST) 2.68 MG tablet, Take 1 tablet by mouth Daily., Disp: , Rfl:     diclofenac (VOLTAREN) 75 MG EC tablet, Take 1 tablet by mouth 2 (Two) Times a Day., Disp: , Rfl:     dicyclomine (BENTYL) 10 MG capsule, Take 1 capsule by mouth 2 (Two) Times a Day., Disp: , Rfl:     EPINEPHrine (EPIPEN) 0.3 MG/0.3ML solution auto-injector injection, , Disp: , Rfl:     famotidine (PEPCID) 40 MG tablet, Take 0.5 tablets by mouth 2 (Two) Times a Day., Disp: , Rfl:     fluticasone  (FLONASE) 50 MCG/ACT nasal spray, , Disp: , Rfl:     lansoprazole (PREVACID) 30 MG capsule, Take 1 capsule by mouth 2 (Two) Times a Day., Disp: , Rfl:     metFORMIN ER (GLUCOPHAGE-XR) 500 MG 24 hr tablet, Take 1 tablet by mouth Daily With Breakfast., Disp: , Rfl:     metoprolol succinate XL (TOPROL-XL) 100 MG 24 hr tablet, Take 1 tablet by mouth 2 (Two) Times a Day., Disp: , Rfl:     multivitamin with minerals tablet tablet, Take 2 tablets by mouth Daily., Disp: , Rfl:     potassium chloride 10 MEQ CR tablet, Take 1 tablet by mouth Daily., Disp: , Rfl:     sertraline (ZOLOFT) 100 MG tablet, Take 2 tablets by mouth 2 (Two) Times a Day., Disp: , Rfl:     Social History     Socioeconomic History    Marital status:    Tobacco Use    Smoking status: Never   Vaping Use    Vaping status: Never Used   Substance and Sexual Activity    Alcohol use: Not Currently    Drug use: Never    Sexual activity: Yes       Social History     Social History Narrative    Lives in [place], [place] w/ partner and 5 children.  Self-employed, cleaning business.         Wt 125 kg (276 lb)   BMI 43.88 kg/m²     Physical Exam  Constitutional:       General: She is not in acute distress.     Appearance: Normal appearance. She is not ill-appearing.   HENT:      Head: Normocephalic and atraumatic.      Nose: Nose normal.   Eyes:      General: No scleral icterus.     Extraocular Movements: Extraocular movements intact.      Conjunctiva/sclera: Conjunctivae normal.      Pupils: Pupils are equal, round, and reactive to light.   Pulmonary:      Effort: Pulmonary effort is normal. No respiratory distress.   Skin:     Coloration: Skin is not pale.   Neurological:      Mental Status: She is alert and oriented to person, place, and time.   Psychiatric:         Mood and Affect: Mood normal.         Behavior: Behavior normal.           Assessment:   pursuing metabolic and bariatric surgery w/ Dr. Kendall.    ICD-10-CM ICD-9-CM   1. Obesity, Class III,  BMI 40-49.9 (morbid obesity)  E66.01 278.01   2. Gastroesophageal reflux disease, unspecified whether esophagitis present  K21.9 530.81       Discussion/Plan:  During diet appointments the patient is educated on high quality nutrition and habits to facilitate good health and possibly some weight loss. Necessary lifestyle changes and behavior modifications were discussed. Please note, the patient remains compliant in completing her diet requirements.     Goals:   1. Continue to be mindful of healthy food choices and portion control.   2. Increase daily protein intake and reduce carbs.  3. Increase daily exercise/activity as able.   4. 3 meals, 1 snack.  Prioritize protein.    EGD with biopsy.  Pt instructed to adhere to NPO after midnight, full liquids for 24 hours before procedure.      Possible conflict with GBUS scheduled on same day as EGD.  Will have Nathan call her to help reconcile the timing, I think we can get both done the same day.    The risks and benefits of the upper endoscopy were discussed with the patient in detail and all questions were answered.  Possibility of perforation, bleeding, aspiration, and anesthesia reaction were reviewed.  Patient agrees to proceed.      Follow up in 1 month. Call w/ issues/concerns.    The patient presents today for telehealth service.  The service was conducted via HIPAA compliant Epic video platform.    The provider is located at her work address.  The patient is located at home in [place] and stated that they are in a secure environment for the session.  The patient's condition being diagnosed/treated is appropriate for telemedicine.       The use of a video visit has been reviewed with the patient and verbal informed consent has been obtained.

## 2024-10-15 ENCOUNTER — PATIENT MESSAGE (OUTPATIENT)
Dept: BARIATRICS/WEIGHT MGMT | Facility: CLINIC | Age: 44
End: 2024-10-15
Payer: COMMERCIAL

## 2024-10-17 NOTE — TELEPHONE ENCOUNTER
Pt called, stated that she would like to have EGD and Colonoscopy at the same time. She could ask her GI provider to do them both but would like to make sure it is ok to do so. Please advise, thanks!

## 2024-10-24 ENCOUNTER — HOSPITAL ENCOUNTER (OUTPATIENT)
Dept: ULTRASOUND IMAGING | Facility: HOSPITAL | Age: 44
Discharge: HOME | End: 2024-10-24
Admitting: PHYSICIAN ASSISTANT
Payer: COMMERCIAL

## 2024-10-24 DIAGNOSIS — K82.9 GALLBLADDER DISEASE: ICD-10-CM

## 2024-10-24 PROCEDURE — 76705 ECHO EXAM OF ABDOMEN: CPT

## 2025-02-12 ENCOUNTER — TELEPHONE (OUTPATIENT)
Dept: UROLOGY | Facility: CLINIC | Age: 45
End: 2025-02-12
Payer: COMMERCIAL

## 2025-02-12 ENCOUNTER — OFFICE VISIT (OUTPATIENT)
Age: 45
End: 2025-02-12
Payer: COMMERCIAL

## 2025-02-12 ENCOUNTER — TELEPHONE (OUTPATIENT)
Age: 45
End: 2025-02-12
Payer: COMMERCIAL

## 2025-02-12 ENCOUNTER — OFFICE VISIT (OUTPATIENT)
Dept: BARIATRICS/WEIGHT MGMT | Facility: CLINIC | Age: 45
End: 2025-02-12
Payer: COMMERCIAL

## 2025-02-12 VITALS
SYSTOLIC BLOOD PRESSURE: 124 MMHG | TEMPERATURE: 96.9 F | HEART RATE: 76 BPM | WEIGHT: 280.8 LBS | RESPIRATION RATE: 16 BRPM | HEIGHT: 67 IN | DIASTOLIC BLOOD PRESSURE: 76 MMHG | BODY MASS INDEX: 44.07 KG/M2

## 2025-02-12 DIAGNOSIS — N39.0 RECURRENT UTI: Primary | ICD-10-CM

## 2025-02-12 DIAGNOSIS — N39.0 URINARY TRACT INFECTION WITHOUT HEMATURIA, SITE UNSPECIFIED: Primary | ICD-10-CM

## 2025-02-12 DIAGNOSIS — E66.01 OBESITY, CLASS III, BMI 40-49.9 (MORBID OBESITY): Primary | ICD-10-CM

## 2025-02-12 LAB
BILIRUB BLD-MCNC: ABNORMAL MG/DL
CLARITY, POC: ABNORMAL
COLOR UR: ABNORMAL
EXPIRATION DATE: ABNORMAL
GLUCOSE UR STRIP-MCNC: NEGATIVE MG/DL
KETONES UR QL: NEGATIVE
LEUKOCYTE EST, POC: ABNORMAL
Lab: ABNORMAL
NITRITE UR-MCNC: NEGATIVE MG/ML
PH UR: 6 [PH] (ref 5–8)
PROT UR STRIP-MCNC: NEGATIVE MG/DL
RBC # UR STRIP: NEGATIVE /UL
SP GR UR: 1.01 (ref 1–1.03)
UROBILINOGEN UR QL: ABNORMAL

## 2025-02-12 RX ORDER — BIOTIN 1 MG
1000 TABLET ORAL 3 TIMES DAILY
COMMUNITY

## 2025-02-12 RX ORDER — NITROFURANTOIN 25; 75 MG/1; MG/1
100 CAPSULE ORAL 2 TIMES DAILY
Qty: 10 CAPSULE | Refills: 0 | Status: SHIPPED | OUTPATIENT
Start: 2025-02-12 | End: 2025-02-17

## 2025-02-12 NOTE — PROGRESS NOTES
Patient came in to leave a urine sample. States when making the appointment, the pain has kept her awake, and pain in lower back/ belly and denies dysuria when having UTI symptoms.  When talking to patient in the office, she states she's been experiencing UTIs in the last few months but has been just going to [place] urgent care to be seen. Denies burning, states the pain is in her lower back more on her right side, and has moved around on her side towards her pelvic area. Not currently on a daily medication but states that was mention at the last appointment, and was given a medication to take after intercourse to help prevent UTI.     Advised the patient, making a follow up to discuss concerns with our CELESTE may benefit her, and would ask the provider if he agrees or if she needs to continue with PCP or urgent care. Patient verbally states she understands.     Urine Dip results in chart, will send off for culture. Please advise on scheduling. Thank you

## 2025-02-12 NOTE — TELEPHONE ENCOUNTER
"Relay     \"[facility] for patient, informing her KARENA Serrano sent him a 5 day course of Macrobid to her pharmacy. Will follow up with patient once culture results come back\"                 "

## 2025-02-12 NOTE — TELEPHONE ENCOUNTER
PT called wanting to know results of urine tests today. I informed her the dip done in office showed a small amount if white blood cells in her urine. I explained the abx was sent because pt is having symptoms and that we will have the results by Friday or monday at the latest. I explained she can start the abx and we will contact her with the results of the urine culture once it's resulted. Pt verbalized understanding.

## 2025-02-12 NOTE — PROGRESS NOTES
[facility]  [address]    [facility] [id number] [phone]      Patient Name:  Monica Condon  :  1980      Date of Visit:  2025      Reason for Visit:  Monthly Diet Visit #2       HPI:  Presents for supervised diet visit.  Pursuing metabolic and bariatric surgery w/ Dr. Kendall.    Denies any medical issues since last visit. Had EGD @ [facility].     Has had a stressful past month. Eats 3 meals per day, plus snack. Made some protein balls to snack on yesterday.     No routine exercise, active at work.     Initial weight: 280 lbs.  Current weight:  280 lbs.    Past Medical History:   Diagnosis Date    Anxiety     Dyspepsia     Dysphagia     Fatigue     Frequent UTI     pending Urology eval    Gallbladder disease     abnormal imaging    GERD (gastroesophageal reflux disease)     controlled w/ daily PPI + Pepcid, denies recent eval    Hypertension     IBS (irritable bowel syndrome)     Joint pain     Morbid obesity      Past Surgical History:   Procedure Laterality Date    COLONOSCOPY      DILATATION AND CURETTAGE  2016       Allergies   Allergen Reactions    Lidocaine Swelling    Shellfish-Derived Products Swelling         Current Outpatient Medications:     Biotin 1000 MCG tablet, Take 1,000 mcg by mouth 3 (Three) Times a Day., Disp: , Rfl:     busPIRone (BUSPAR) 7.5 MG tablet, Take 1 tablet by mouth 2 (Two) Times a Day., Disp: , Rfl:     clemastine (TAVIST) 2.68 MG tablet, Take 1 tablet by mouth Daily., Disp: , Rfl:     diclofenac (VOLTAREN) 75 MG EC tablet, Take 1 tablet by mouth 2 (Two) Times a Day., Disp: , Rfl:     dicyclomine (BENTYL) 10 MG capsule, Take 1 capsule by mouth 2 (Two) Times a Day., Disp: , Rfl:     famotidine (PEPCID) 40 MG tablet, Take 1 tablet by mouth At Night As Needed., Disp: , Rfl:     lansoprazole (PREVACID) 30 MG capsule, Take 1 capsule by mouth Daily., Disp: , Rfl:     METFORMIN HCL ER PO, Take 1,000 mg by  "mouth Daily With Breakfast., Disp: , Rfl:     metoprolol succinate XL (TOPROL-XL) 100 MG 24 hr tablet, Take 1 tablet by mouth 2 (Two) Times a Day., Disp: , Rfl:     multivitamin with minerals tablet tablet, Take 2 tablets by mouth Daily., Disp: , Rfl:     potassium chloride 10 MEQ CR tablet, Take 1 tablet by mouth Daily., Disp: , Rfl:     sertraline (ZOLOFT) 100 MG tablet, Take 2 tablets by mouth every night at bedtime., Disp: , Rfl:     EPINEPHrine (EPIPEN) 0.3 MG/0.3ML solution auto-injector injection, , Disp: , Rfl:     fluticasone (FLONASE) 50 MCG/ACT nasal spray, , Disp: , Rfl:     Social History     Socioeconomic History    Marital status:    Tobacco Use    Smoking status: Never   Vaping Use    Vaping status: Never Used   Substance and Sexual Activity    Alcohol use: Not Currently    Drug use: Never    Sexual activity: Yes       Social History     Social History Narrative    Lives in [place], [place] w/ partner and 5 children.  Self-employed, cleaning business.         /76 (BP Location: Left arm, Patient Position: Sitting)   Pulse 76   Temp 96.9 °F (36.1 °C)   Resp 16   Ht 168.9 cm (66.5\")   Wt 127 kg (280 lb 12.8 oz)   BMI 44.64 kg/m²     Physical Exam  Constitutional:       Appearance: She is well-developed.   Cardiovascular:      Rate and Rhythm: Normal rate.   Pulmonary:      Effort: Pulmonary effort is normal.   Musculoskeletal:         General: Normal range of motion.   Neurological:      Mental Status: She is alert.   Psychiatric:         Thought Content: Thought content normal.         Judgment: Judgment normal.           Assessment:   pursuing metabolic and bariatric surgery w/ Dr. Kendall.    ICD-10-CM ICD-9-CM   1. Obesity, Class III, BMI 40-49.9 (morbid obesity)  E66.01 278.01       Discussion/Plan:  During diet appointments the patient is educated on high quality nutrition and habits to facilitate good health and possibly some weight loss. Necessary lifestyle changes and " behavior modifications were discussed. Please note, the patient remains compliant in completing her diet requirements.     Goals:   1. Continue to be mindful of healthy food choices and portion control.   2. Increase daily protein intake and reduce carbs.  3. Increase daily exercise/activity as able.   4. Begin tracking intake on [employer].     Follow up in 1 month. Call w/ issues/concerns.    KARENA Bradley

## 2025-02-13 PROCEDURE — 87086 URINE CULTURE/COLONY COUNT: CPT | Performed by: NURSE PRACTITIONER

## 2025-02-15 LAB — BACTERIA SPEC AEROBE CULT: NO GROWTH

## 2025-03-10 ENCOUNTER — NURSE TRIAGE (OUTPATIENT)
Dept: CALL CENTER | Facility: HOSPITAL | Age: 45
End: 2025-03-10
Payer: COMMERCIAL

## 2025-03-10 NOTE — TELEPHONE ENCOUNTER
"Call transferred from [facility].  Calling for new pt appt.  Caller had a resp virus 2 weeks ago and the cough has improved.  She still feels like she needs an inhaler at times.  Warm transfer to [facility]inger at the [facility] Redirect for appt.  Reason for Disposition  • Requesting regular office appointment    Additional Information  • Negative: [1] Caller is not with the adult (patient) AND [2] reporting urgent symptoms  • Negative: Lab result questions  • Negative: Medication questions  • Negative: Caller can't be reached by phone  • Negative: Caller has already spoken to PCP or another triager  • Negative: RN needs further essential information from caller in order to complete triage  • Negative: [1] Caller requesting NON-URGENT health information AND [2] PCP's office is the best resource  • Negative: Health Information question, no triage required and triager able to answer question    Answer Assessment - Initial Assessment Questions  1. REASON FOR CALL or QUESTION: \"What is your reason for calling today?\" or \"How can I best help you?\" or \"What question do you have that I can help answer?\"      appt    Protocols used: Information Only Call - No Triage-ADULT-    "

## 2025-05-05 ENCOUNTER — LAB (OUTPATIENT)
Dept: LAB | Facility: HOSPITAL | Age: 45
End: 2025-05-05
Payer: COMMERCIAL

## 2025-05-05 ENCOUNTER — PRIOR AUTHORIZATION (OUTPATIENT)
Dept: FAMILY MEDICINE CLINIC | Facility: CLINIC | Age: 45
End: 2025-05-05

## 2025-05-05 ENCOUNTER — OFFICE VISIT (OUTPATIENT)
Dept: FAMILY MEDICINE CLINIC | Facility: CLINIC | Age: 45
End: 2025-05-05
Payer: COMMERCIAL

## 2025-05-05 VITALS
HEART RATE: 79 BPM | OXYGEN SATURATION: 98 % | SYSTOLIC BLOOD PRESSURE: 138 MMHG | DIASTOLIC BLOOD PRESSURE: 96 MMHG | HEIGHT: 67 IN | BODY MASS INDEX: 44.73 KG/M2 | WEIGHT: 285 LBS

## 2025-05-05 DIAGNOSIS — G89.29 CHRONIC BILATERAL LOW BACK PAIN WITH RIGHT-SIDED SCIATICA: Primary | ICD-10-CM

## 2025-05-05 DIAGNOSIS — M25.50 POLYARTHRALGIA: ICD-10-CM

## 2025-05-05 DIAGNOSIS — Z13.1 SCREENING FOR DIABETES MELLITUS: ICD-10-CM

## 2025-05-05 DIAGNOSIS — Z13.228 SCREENING FOR METABOLIC DISORDER: ICD-10-CM

## 2025-05-05 DIAGNOSIS — E66.813 CLASS 3 SEVERE OBESITY WITH SERIOUS COMORBIDITY AND BODY MASS INDEX (BMI) OF 45.0 TO 49.9 IN ADULT, UNSPECIFIED OBESITY TYPE: ICD-10-CM

## 2025-05-05 DIAGNOSIS — M54.41 CHRONIC BILATERAL LOW BACK PAIN WITH RIGHT-SIDED SCIATICA: Primary | ICD-10-CM

## 2025-05-05 DIAGNOSIS — Z13.220 SCREENING FOR CHOLESTEROL LEVEL: ICD-10-CM

## 2025-05-05 DIAGNOSIS — Z00.00 ENCOUNTER FOR MEDICAL EXAMINATION TO ESTABLISH CARE: ICD-10-CM

## 2025-05-05 DIAGNOSIS — N39.0 URINARY TRACT INFECTION WITHOUT HEMATURIA, SITE UNSPECIFIED: ICD-10-CM

## 2025-05-05 DIAGNOSIS — E66.01 CLASS 3 SEVERE OBESITY WITH SERIOUS COMORBIDITY AND BODY MASS INDEX (BMI) OF 45.0 TO 49.9 IN ADULT, UNSPECIFIED OBESITY TYPE: ICD-10-CM

## 2025-05-05 DIAGNOSIS — F41.1 GAD (GENERALIZED ANXIETY DISORDER): ICD-10-CM

## 2025-05-05 LAB
ALBUMIN SERPL-MCNC: 4.1 G/DL (ref 3.5–5.2)
ALBUMIN/GLOB SERPL: 1.3 G/DL
ALP SERPL-CCNC: 69 U/L (ref 39–117)
ALT SERPL W P-5'-P-CCNC: 23 U/L (ref 1–33)
ANION GAP SERPL CALCULATED.3IONS-SCNC: 11.2 MMOL/L (ref 5–15)
AST SERPL-CCNC: 25 U/L (ref 1–32)
BILIRUB BLD-MCNC: NEGATIVE MG/DL
BILIRUB SERPL-MCNC: 0.5 MG/DL (ref 0–1.2)
BUN SERPL-MCNC: 13 MG/DL (ref 6–20)
BUN/CREAT SERPL: 14.3 (ref 7–25)
CALCIUM SPEC-SCNC: 9.4 MG/DL (ref 8.6–10.5)
CHLORIDE SERPL-SCNC: 101 MMOL/L (ref 98–107)
CHOLEST SERPL-MCNC: 249 MG/DL (ref 0–200)
CLARITY, POC: CLEAR
CO2 SERPL-SCNC: 24.8 MMOL/L (ref 22–29)
COLOR UR: YELLOW
CREAT SERPL-MCNC: 0.91 MG/DL (ref 0.57–1)
DEPRECATED RDW RBC AUTO: 45.4 FL (ref 37–54)
EGFRCR SERPLBLD CKD-EPI 2021: 79.4 ML/MIN/1.73
ERYTHROCYTE [DISTWIDTH] IN BLOOD BY AUTOMATED COUNT: 13.4 % (ref 12.3–15.4)
EXPIRATION DATE: NORMAL
GLOBULIN UR ELPH-MCNC: 3.1 GM/DL
GLUCOSE SERPL-MCNC: 86 MG/DL (ref 65–99)
GLUCOSE UR STRIP-MCNC: NEGATIVE MG/DL
HCT VFR BLD AUTO: 39.3 % (ref 34–46.6)
HDLC SERPL-MCNC: 51 MG/DL (ref 40–60)
HGB BLD-MCNC: 12.7 G/DL (ref 12–15.9)
KETONES UR QL: NEGATIVE
LDLC SERPL CALC-MCNC: 147 MG/DL (ref 0–100)
LDLC/HDLC SERPL: 2.8 {RATIO}
LEUKOCYTE EST, POC: NEGATIVE
Lab: NORMAL
MCH RBC QN AUTO: 30.1 PG (ref 26.6–33)
MCHC RBC AUTO-ENTMCNC: 32.3 G/DL (ref 31.5–35.7)
MCV RBC AUTO: 93.1 FL (ref 79–97)
NITRITE UR-MCNC: NEGATIVE MG/ML
PH UR: 6 [PH] (ref 5–8)
PLATELET # BLD AUTO: 248 10*3/MM3 (ref 140–450)
PMV BLD AUTO: 10.4 FL (ref 6–12)
POTASSIUM SERPL-SCNC: 4.3 MMOL/L (ref 3.5–5.2)
PROT SERPL-MCNC: 7.2 G/DL (ref 6–8.5)
PROT UR STRIP-MCNC: NEGATIVE MG/DL
RBC # BLD AUTO: 4.22 10*6/MM3 (ref 3.77–5.28)
RBC # UR STRIP: NEGATIVE /UL
SODIUM SERPL-SCNC: 137 MMOL/L (ref 136–145)
SP GR UR: 1.01 (ref 1–1.03)
TRIGL SERPL-MCNC: 277 MG/DL (ref 0–150)
TSH SERPL DL<=0.05 MIU/L-ACNC: 3 UIU/ML (ref 0.27–4.2)
UROBILINOGEN UR QL: NORMAL
VLDLC SERPL-MCNC: 51 MG/DL (ref 5–40)
WBC NRBC COR # BLD AUTO: 6.96 10*3/MM3 (ref 3.4–10.8)

## 2025-05-05 PROCEDURE — 83036 HEMOGLOBIN GLYCOSYLATED A1C: CPT

## 2025-05-05 PROCEDURE — 80053 COMPREHEN METABOLIC PANEL: CPT

## 2025-05-05 PROCEDURE — 84443 ASSAY THYROID STIM HORMONE: CPT

## 2025-05-05 PROCEDURE — 86038 ANTINUCLEAR ANTIBODIES: CPT

## 2025-05-05 PROCEDURE — 80061 LIPID PANEL: CPT

## 2025-05-05 PROCEDURE — 85027 COMPLETE CBC AUTOMATED: CPT

## 2025-05-05 RX ORDER — BUSPIRONE HYDROCHLORIDE 10 MG/1
10 TABLET ORAL 2 TIMES DAILY
Qty: 60 TABLET | Refills: 5 | Status: SHIPPED | OUTPATIENT
Start: 2025-05-05

## 2025-05-05 RX ORDER — METHOCARBAMOL 500 MG/1
500 TABLET, FILM COATED ORAL 3 TIMES DAILY PRN
Qty: 60 TABLET | Refills: 0 | Status: SHIPPED | OUTPATIENT
Start: 2025-05-05

## 2025-05-05 RX ORDER — ASPIRIN 81 MG/1
81 TABLET ORAL DAILY
COMMUNITY

## 2025-05-05 NOTE — TELEPHONE ENCOUNTER
(Key: [id number]) - [id number]  Wegovy 0.5MG/0.5ML auto-injectors  status: PA RequestCreated: May 5th, 2025 [phone]: May 5th, 2025    Message from plan: This request has not been approved. Based on the information we received, you did not meet the specific rule(s) needed to approve this request. In some cases, the requested drug or alternatives offered may have other guideline rules that need to be met. Our guideline named WEGOVY requires the following rule(s) be met for approval:The medication is being prescribed to lower the risk of death from cardiovascular [heart] causes, myocardial infarction [heart attack], or stroke. Please note: Wegovy used for weight loss is considered a benefit exclusion pursuant to the Social Security Act 1927(D)(2).This is why your request is denied. Please work with your doctor to use a different medication or get us more information if it will allow us to approve this request. Please note that there are additional requirements for the above listed diagnosis.     [employer]  [facility]  [address] [place] [address]  or  Fax [phone]

## 2025-05-05 NOTE — PROGRESS NOTES
New Patient Office Visit      Patient Name: Monica Condon  : 1980   MRN: [id number]   Care Team: Patient Care Team:  Provider, No Known as PCP - General    Chief Complaint:    Chief Complaint   Patient presents with   • Urinary Tract Infection       History of Present Illness: Monica Condon is a 44 y.o. female with IBS, GERD, HTN, anxiety, obesity who is here today to establish care.  She is in long term relationship. She has 5 kids. 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     Previously following with for PCP needs.    GERD, IBS - following with  GI. Last EGD and colo 2025. Compliant with lansoprazole daily and pepcid 40mg prn.     HTN - well controlled with metoprolol    Obesity - following with bariatrics, undergoing eval for surgical candidacy. She is taking semaglutide compound 2.5mg weekly. Frustrated by lack of results over the past few weeks. She is hopeful to achieve weight loss through aide of medication and would     Anxiety - compliant with zoloft 200mg and buspar 7.5mg BID. Reports this is uncontrolled at times. Ongoing for >20 years.    Reports concerns for arthritis. 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 She takes oral diclofenac twice daily every day and tylenol. This is minimally beneficial. She describes bilateral low back pain with radiation into RLE beyond her knee. Pain is constant. She feels very stiff if she sits for too long. Admits to occasional LE tingling. Pain has been particularly bothersome for 6 months. She reports MRI L spine several years ago (>7) which confirmed bulging discs.     Reports bilateral hand pain and stiffness. She has hx of carpal tunnel surgical release bilaterally. Still feels  strength is weak R>L. She reports chronic swelling in a few of the joints of her hands, R 3rd PIP, L 2nd PIP.   Reports in cold months her fingertips feel numb but on color changes.    Wants urine checked for UTI due to low  back pain. No dysuria     She reports regular menstrual cycles    HTN - reports she monitors readings at home. Home BP is well controlled with metoprolol 100mg BID    Subjective      Review of Systems:   Review of Systems - See HPI    Past Medical History:   Past Medical History:   Diagnosis Date   • Allergic    • Anxiety    • Arthritis 2022   • Dyspepsia    • Dysphagia    • Fatigue    • Frequent UTI     pending Urology eval   • Gallbladder disease     abnormal imaging   • GERD (gastroesophageal reflux disease)    • Hyperlipidemia 2024    Take omega   • Hypertension     While pregnant and thereafter   • IBS (irritable bowel syndrome)    • Joint pain    • Low back pain    • Morbid obesity    • Urinary incontinence        Past Surgical History:   Past Surgical History:   Procedure Laterality Date   • COLONOSCOPY  2017   • DILATATION AND CURETTAGE  2016       Family History:   Family History   Problem Relation Age of Onset   • Diabetes Mother 70   • Hypertension Mother    • Heart attack Mother 80   • Heart disease Mother    • Diabetes Father 60   • Hypertension Father    • Heart attack Father 75   • Obesity Sister    • Diabetes Sister    • Drug abuse Sister    • Lung cancer Maternal Grandfather    • Stroke Maternal Aunt        Social History:   Social History     Socioeconomic History   • Marital status:    Tobacco Use   • Smoking status: Never   • Smokeless tobacco: Never   Vaping Use   • Vaping status: Never Used   Substance and Sexual Activity   • Alcohol use: Yes     Alcohol/week: 1.0 standard drink of alcohol     Types: 1 Drinks containing 0.5 oz of alcohol per week     Comment: Maybe 1-2 a month   • Drug use: Never   • Sexual activity: Yes     Partners: Female     Birth control/protection: Partner of same sex       Tobacco History:   Social History     Tobacco Use   Smoking Status Never   Smokeless Tobacco Never       Medications:     Current Outpatient Medications:   •  aspirin 81 MG EC tablet, Take 1  "tablet by mouth Daily., Disp: , Rfl:   •  Biotin 1000 MCG tablet, Take 1,000 mcg by mouth 3 (Three) Times a Day., Disp: , Rfl:   •  busPIRone (BUSPAR) 10 MG tablet, Take 1 tablet by mouth 2 (Two) Times a Day., Disp: 60 tablet, Rfl: 5  •  clemastine (TAVIST) 2.68 MG tablet, Take 1 tablet by mouth Daily., Disp: , Rfl:   •  diclofenac (VOLTAREN) 75 MG EC tablet, Take 1 tablet by mouth 2 (Two) Times a Day., Disp: , Rfl:   •  dicyclomine (BENTYL) 10 MG capsule, Take 1 capsule by mouth 2 (Two) Times a Day., Disp: , Rfl:   •  famotidine (PEPCID) 40 MG tablet, Take 1 tablet by mouth At Night As Needed., Disp: , Rfl:   •  lansoprazole (PREVACID) 30 MG capsule, Take 1 capsule by mouth Daily., Disp: , Rfl:   •  metoprolol succinate XL (TOPROL-XL) 100 MG 24 hr tablet, Take 1 tablet by mouth 2 (Two) Times a Day., Disp: , Rfl:   •  multivitamin with minerals tablet tablet, Take 2 tablets by mouth Daily., Disp: , Rfl:   •  sertraline (ZOLOFT) 100 MG tablet, Take 2 tablets by mouth every night at bedtime., Disp: , Rfl:   •  Diclofenac Sodium (VOLTAREN) 1 % gel gel, Apply 4 g topically to the appropriate area as directed 4 (Four) Times a Day As Needed (pain)., Disp: 100 g, Rfl: 5  •  methocarbamol (ROBAXIN) 500 MG tablet, Take 1 tablet by mouth 3 (Three) Times a Day As Needed for Muscle Spasms., Disp: 60 tablet, Rfl: 0  •  Semaglutide-Weight Management 0.5 MG/0.5ML solution auto-injector, Inject 0.5 mL under the skin into the appropriate area as directed 1 (One) Time Per Week., Disp: 2 mL, Rfl: 0    Allergies:   Allergies   Allergen Reactions   • Lidocaine Swelling   • Shellfish-Derived Products Swelling       Objective     Physical Exam:  Vital Signs:   Vitals:    05/05/25 0927   BP: 138/96   Pulse: 79   SpO2: 98%   Weight: 129 kg (285 lb)   Height: 168.9 cm (66.5\")     Body mass index is 45.31 kg/m².     Physical Exam  Vitals reviewed.   Constitutional:       Appearance: Normal appearance. She is obese.   Cardiovascular:      Rate " and Rhythm: Normal rate and regular rhythm.   Pulmonary:      Effort: Pulmonary effort is normal. No respiratory distress.   Skin:     General: Skin is warm and dry.   Neurological:      Mental Status: She is alert.   Psychiatric:         Mood and Affect: Mood normal.         Behavior: Behavior normal.         Judgment: Judgment normal.         Assessment / Plan      Assessment/Plan:   Problems Addressed This Visit  Diagnoses and all orders for this visit:    1. Chronic bilateral low back pain with right-sided sciatica (Primary)  -     POC Urinalysis Dipstick, Automated  -     methocarbamol (ROBAXIN) 500 MG tablet; Take 1 tablet by mouth 3 (Three) Times a Day As Needed for Muscle Spasms.  Dispense: 60 tablet; Refill: 0  -     Ambulatory Referral to Physical Therapy for Evaluation & Treatment  -     Diclofenac Sodium (VOLTAREN) 1 % gel gel; Apply 4 g topically to the appropriate area as directed 4 (Four) Times a Day As Needed (pain).  Dispense: 100 g; Refill: 5    2. Urinary tract infection without hematuria, site unspecified    3. Polyarthralgia  -     TSH; Future  -     TIFFANY by IFA, Reflex 9-biomarkers profile; Future    4. Encounter for medical examination to establish care  -     CBC (No Diff); Future  -     Lipid Panel; Future  -     Hemoglobin A1c; Future  -     Comprehensive Metabolic Panel; Future  -     TSH; Future  -     TIFFANY by IFA, Reflex 9-biomarkers profile; Future    5. Screening for cholesterol level  -     Lipid Panel; Future    6. Screening for diabetes mellitus  -     Hemoglobin A1c; Future    7. Screening for metabolic disorder  -     Comprehensive Metabolic Panel; Future  -     TSH; Future    8. JONATAN (generalized anxiety disorder)  -     busPIRone (BUSPAR) 10 MG tablet; Take 1 tablet by mouth 2 (Two) Times a Day.  Dispense: 60 tablet; Refill: 5    9. Class 3 severe obesity with serious comorbidity and body mass index (BMI) of 45.0 to 49.9 in adult, unspecified obesity type  -     Semaglutide-Weight  Management 0.5 MG/0.5ML solution auto-injector; Inject 0.5 mL under the skin into the appropriate area as directed 1 (One) Time Per Week.  Dispense: 2 mL; Refill: 0      Discussed importance of preventative care including vaccinations, age appropriate cancer screening, routine lab work, healthy diet, and active lifestyle.     Obesity - Patient has tried to lose weight for several months without significant success with lifestyle modifications, calorie reduction, and increased physical activity alone. She has tried weight watchers, atkins diet/keto, intermittent fasting, isagenix without benefit. Their elevated BMI of 45 with coexisting HTN, HLD puts them at increased risk for developing cardiovascular disease, ROSALBA, T2DM, metabolic syndrome, among others. Patient would benefit  from medication to assist with weight loss. We will try Wegovy in addition to current dietary and exercise modifications. Discussed potential side effects and patient will let me know if they experiences these. We discussed importance of uptitrating dose monthly to avoid GI side effects and they expressed understanding.  No personal or family history of MEN2 syndrome, pancreatitis, medullary thyroid cancer.     If insurance company does not approve wegovy, she would like to continue compounded semaglutide and is due to increase to 0.5mg weekly. Tolerating this well.     Anxiety - uncontrolled, continue zoloft 200mg daily and increase buspar to 10mg BID     HTN - elevated today but home readings at goal <130/80. Continue metoprolol BID     Polyarthralgia, low back pain with right sided sciatica - referred to PT. Would like to minimize NSAID use based on gastritis concerns. Swtich to topical voltaren and encouraged tylenol. If pain persists following PT trial, she would benefit from MRI. Last was 7 years ago with bulging discs.     UA completely bland today    GERD - continue PPI and H2 blocker, per [facility]      Plan of care reviewed with patient  at the conclusion of today's visit. Education was provided regarding diagnosis and management.  Patient verbalizes understanding of and agreement with management plan.      Follow Up:   Return in about 6 weeks (around 6/16/2025) for Recheck low back pain, anxiety, weight .    I spent 60 minutes caring for Monica on this date of service. This time includes time spent by me in the following activities:preparing for the visit, reviewing tests, obtaining and/or reviewing a separately obtained history, performing a medically appropriate examination and/or evaluation , counseling and educating the patient/family/caregiver, ordering medications, tests, or procedures, referring and communicating with other health care professionals , and documenting information in the medical record      DO JAI Fitzpatrick RD  [facility]  [id number]  [facility] [address]  Fax [phone]  Phone [phone]

## 2025-05-06 LAB
ANA SER QL IF: NEGATIVE
HBA1C MFR BLD: 4.9 % (ref 4.8–5.6)
LABORATORY COMMENT REPORT: NORMAL

## 2025-05-08 ENCOUNTER — PATIENT ROUNDING (BHMG ONLY) (OUTPATIENT)
Dept: FAMILY MEDICINE CLINIC | Facility: CLINIC | Age: 45
End: 2025-05-08
Payer: COMMERCIAL

## 2025-05-09 ENCOUNTER — PATIENT MESSAGE (OUTPATIENT)
Dept: FAMILY MEDICINE CLINIC | Facility: CLINIC | Age: 45
End: 2025-05-09
Payer: COMMERCIAL

## 2025-05-13 ENCOUNTER — RESULTS FOLLOW-UP (OUTPATIENT)
Dept: FAMILY MEDICINE CLINIC | Facility: CLINIC | Age: 45
End: 2025-05-13
Payer: COMMERCIAL

## 2025-05-13 DIAGNOSIS — M51.362 DEGENERATION OF INTERVERTEBRAL DISC OF LUMBAR REGION WITH DISCOGENIC BACK PAIN AND LOWER EXTREMITY PAIN: ICD-10-CM

## 2025-05-13 DIAGNOSIS — G89.29 CHRONIC BILATERAL LOW BACK PAIN WITH RIGHT-SIDED SCIATICA: Primary | ICD-10-CM

## 2025-05-13 DIAGNOSIS — M54.41 CHRONIC BILATERAL LOW BACK PAIN WITH RIGHT-SIDED SCIATICA: Primary | ICD-10-CM

## 2025-06-03 ENCOUNTER — HOSPITAL ENCOUNTER (OUTPATIENT)
Dept: MRI IMAGING | Facility: HOSPITAL | Age: 45
Discharge: HOME | End: 2025-06-03
Admitting: STUDENT IN AN ORGANIZED HEALTH CARE EDUCATION/TRAINING PROGRAM
Payer: COMMERCIAL

## 2025-06-03 ENCOUNTER — TELEPHONE (OUTPATIENT)
Dept: FAMILY MEDICINE CLINIC | Facility: CLINIC | Age: 45
End: 2025-06-03
Payer: COMMERCIAL

## 2025-06-03 DIAGNOSIS — M54.41 CHRONIC BILATERAL LOW BACK PAIN WITH RIGHT-SIDED SCIATICA: ICD-10-CM

## 2025-06-03 DIAGNOSIS — M51.362 DEGENERATION OF INTERVERTEBRAL DISC OF LUMBAR REGION WITH DISCOGENIC BACK PAIN AND LOWER EXTREMITY PAIN: ICD-10-CM

## 2025-06-03 DIAGNOSIS — G89.29 CHRONIC BILATERAL LOW BACK PAIN WITH RIGHT-SIDED SCIATICA: ICD-10-CM

## 2025-06-03 PROCEDURE — 72148 MRI LUMBAR SPINE W/O DYE: CPT

## 2025-06-03 NOTE — TELEPHONE ENCOUNTER
Provider: DO HENRIQUEZ    Caller: Monica Condon    Relationship to Patient: Self    Phone Number: [phone]     Reason for Call: PATIENT IS WANTING TO KNOW IF THE MRI FOR 6.3.25 WOULD BE ABLE TO HAVE ADDITIONAL ORDERS PLACED TO COVER HER HIPS AND OVARIES AS WELL. HER OBGYN RECOMMENDS IT, BUT RECOMMENDED ASKING PCP TO PLACE THE ORDER TO EXTEND THE SCOPE OF THE MRI

## 2025-06-03 NOTE — TELEPHONE ENCOUNTER
Unable to reach [place] BIANKA Condon by phone or leave message.    Hub may relay message and document.    Dr loving recommends appointment before adding additional imaging.  This was also sent in [place] message

## 2025-06-10 ENCOUNTER — HOSPITAL ENCOUNTER (EMERGENCY)
Facility: HOSPITAL | Age: 45
Discharge: HOME | End: 2025-06-10
Attending: EMERGENCY MEDICINE | Admitting: EMERGENCY MEDICINE
Payer: COMMERCIAL

## 2025-06-10 ENCOUNTER — APPOINTMENT (OUTPATIENT)
Dept: CT IMAGING | Facility: HOSPITAL | Age: 45
End: 2025-06-10
Payer: COMMERCIAL

## 2025-06-10 ENCOUNTER — OFFICE VISIT (OUTPATIENT)
Dept: FAMILY MEDICINE CLINIC | Facility: CLINIC | Age: 45
End: 2025-06-10
Payer: COMMERCIAL

## 2025-06-10 VITALS
WEIGHT: 280 LBS | HEART RATE: 64 BPM | SYSTOLIC BLOOD PRESSURE: 169 MMHG | OXYGEN SATURATION: 99 % | BODY MASS INDEX: 43.95 KG/M2 | TEMPERATURE: 98.1 F | DIASTOLIC BLOOD PRESSURE: 99 MMHG | RESPIRATION RATE: 18 BRPM | HEIGHT: 67 IN

## 2025-06-10 VITALS
OXYGEN SATURATION: 100 % | DIASTOLIC BLOOD PRESSURE: 90 MMHG | HEIGHT: 67 IN | WEIGHT: 281 LBS | TEMPERATURE: 98.1 F | BODY MASS INDEX: 44.1 KG/M2 | HEART RATE: 64 BPM | SYSTOLIC BLOOD PRESSURE: 146 MMHG

## 2025-06-10 DIAGNOSIS — E27.8 ADRENAL CYST: ICD-10-CM

## 2025-06-10 DIAGNOSIS — K57.90 DIVERTICULOSIS: ICD-10-CM

## 2025-06-10 DIAGNOSIS — R10.9 RIGHT FLANK PAIN: ICD-10-CM

## 2025-06-10 DIAGNOSIS — R31.9 HEMATURIA, UNSPECIFIED TYPE: ICD-10-CM

## 2025-06-10 DIAGNOSIS — R10.9 ACUTE RIGHT FLANK PAIN: Primary | ICD-10-CM

## 2025-06-10 DIAGNOSIS — R10.31 RLQ ABDOMINAL PAIN: Primary | ICD-10-CM

## 2025-06-10 LAB
ALBUMIN SERPL-MCNC: 3.9 G/DL (ref 3.5–5.2)
ALBUMIN/GLOB SERPL: 1.4 G/DL
ALP SERPL-CCNC: 60 U/L (ref 39–117)
ALT SERPL W P-5'-P-CCNC: 26 U/L (ref 1–33)
ANION GAP SERPL CALCULATED.3IONS-SCNC: 12 MMOL/L (ref 5–15)
AST SERPL-CCNC: 37 U/L (ref 1–32)
B-HCG UR QL: NEGATIVE
BASOPHILS # BLD AUTO: 0.04 10*3/MM3 (ref 0–0.2)
BASOPHILS NFR BLD AUTO: 0.6 % (ref 0–1.5)
BILIRUB BLD-MCNC: NEGATIVE MG/DL
BILIRUB SERPL-MCNC: 0.7 MG/DL (ref 0–1.2)
BILIRUB UR QL STRIP: NEGATIVE
BUN SERPL-MCNC: 15.9 MG/DL (ref 6–20)
BUN/CREAT SERPL: 19.2 (ref 7–25)
CALCIUM SPEC-SCNC: 9.1 MG/DL (ref 8.6–10.5)
CHLORIDE SERPL-SCNC: 104 MMOL/L (ref 98–107)
CLARITY UR: CLEAR
CLARITY, POC: CLEAR
CLUMPED PLATELETS: PRESENT
CO2 SERPL-SCNC: 20 MMOL/L (ref 22–29)
COLOR UR: ABNORMAL
COLOR UR: YELLOW
CREAT SERPL-MCNC: 0.83 MG/DL (ref 0.57–1)
DEPRECATED RDW RBC AUTO: 41.8 FL (ref 37–54)
EGFRCR SERPLBLD CKD-EPI 2021: 88.7 ML/MIN/1.73
EOSINOPHIL # BLD AUTO: 0.25 10*3/MM3 (ref 0–0.4)
EOSINOPHIL NFR BLD AUTO: 3.6 % (ref 0.3–6.2)
ERYTHROCYTE [DISTWIDTH] IN BLOOD BY AUTOMATED COUNT: 12.7 % (ref 12.3–15.4)
EXPIRATION DATE: ABNORMAL
EXPIRATION DATE: NORMAL
GLOBULIN UR ELPH-MCNC: 2.7 GM/DL
GLUCOSE SERPL-MCNC: 94 MG/DL (ref 65–99)
GLUCOSE UR STRIP-MCNC: NEGATIVE MG/DL
GLUCOSE UR STRIP-MCNC: NEGATIVE MG/DL
HCT VFR BLD AUTO: 38.5 % (ref 34–46.6)
HGB BLD-MCNC: 12.7 G/DL (ref 12–15.9)
HGB UR QL STRIP.AUTO: NEGATIVE
IMM GRANULOCYTES # BLD AUTO: 0.02 10*3/MM3 (ref 0–0.05)
IMM GRANULOCYTES NFR BLD AUTO: 0.3 % (ref 0–0.5)
INTERNAL NEGATIVE CONTROL: NEGATIVE
INTERNAL POSITIVE CONTROL: NORMAL
KETONES UR QL STRIP: NEGATIVE
KETONES UR QL: NEGATIVE
LEUKOCYTE EST, POC: NEGATIVE
LEUKOCYTE ESTERASE UR QL STRIP.AUTO: NEGATIVE
LIPASE SERPL-CCNC: 28 U/L (ref 13–60)
LYMPHOCYTES # BLD AUTO: 2.15 10*3/MM3 (ref 0.7–3.1)
LYMPHOCYTES NFR BLD AUTO: 30.6 % (ref 19.6–45.3)
Lab: ABNORMAL
Lab: NORMAL
MCH RBC QN AUTO: 30.2 PG (ref 26.6–33)
MCHC RBC AUTO-ENTMCNC: 33 G/DL (ref 31.5–35.7)
MCV RBC AUTO: 91.4 FL (ref 79–97)
MONOCYTES # BLD AUTO: 0.5 10*3/MM3 (ref 0.1–0.9)
MONOCYTES NFR BLD AUTO: 7.1 % (ref 5–12)
NEUTROPHILS NFR BLD AUTO: 4.06 10*3/MM3 (ref 1.7–7)
NEUTROPHILS NFR BLD AUTO: 57.8 % (ref 42.7–76)
NITRITE UR QL STRIP: NEGATIVE
NITRITE UR-MCNC: NEGATIVE MG/ML
NRBC BLD AUTO-RTO: 0 /100 WBC (ref 0–0.2)
PH UR STRIP.AUTO: 6.5 [PH] (ref 5–8)
PH UR: 6 [PH] (ref 5–8)
PLATELET # BLD AUTO: 177 10*3/MM3 (ref 140–450)
PMV BLD AUTO: 10.7 FL (ref 6–12)
POTASSIUM SERPL-SCNC: 4.8 MMOL/L (ref 3.5–5.2)
PROT SERPL-MCNC: 6.6 G/DL (ref 6–8.5)
PROT UR QL STRIP: NEGATIVE
PROT UR STRIP-MCNC: NEGATIVE MG/DL
RBC # BLD AUTO: 4.21 10*6/MM3 (ref 3.77–5.28)
RBC # UR STRIP: ABNORMAL /UL
RBC MORPH BLD: NORMAL
SODIUM SERPL-SCNC: 136 MMOL/L (ref 136–145)
SP GR UR STRIP: 1.01 (ref 1–1.03)
SP GR UR: 1.01 (ref 1–1.03)
UROBILINOGEN UR QL STRIP: ABNORMAL
UROBILINOGEN UR QL: ABNORMAL
WBC MORPH BLD: NORMAL
WBC NRBC COR # BLD AUTO: 7.02 10*3/MM3 (ref 3.4–10.8)

## 2025-06-10 PROCEDURE — 81003 URINALYSIS AUTO W/O SCOPE: CPT

## 2025-06-10 PROCEDURE — 85007 BL SMEAR W/DIFF WBC COUNT: CPT

## 2025-06-10 PROCEDURE — 81025 URINE PREGNANCY TEST: CPT

## 2025-06-10 PROCEDURE — 25510000001 IOPAMIDOL 61 % SOLUTION: Performed by: EMERGENCY MEDICINE

## 2025-06-10 PROCEDURE — 99285 EMERGENCY DEPT VISIT HI MDM: CPT

## 2025-06-10 PROCEDURE — 74177 CT ABD & PELVIS W/CONTRAST: CPT

## 2025-06-10 PROCEDURE — 80053 COMPREHEN METABOLIC PANEL: CPT

## 2025-06-10 PROCEDURE — 83690 ASSAY OF LIPASE: CPT

## 2025-06-10 PROCEDURE — 85025 COMPLETE CBC W/AUTO DIFF WBC: CPT

## 2025-06-10 RX ORDER — IOPAMIDOL 612 MG/ML
85 INJECTION, SOLUTION INTRAVASCULAR
Status: COMPLETED | OUTPATIENT
Start: 2025-06-10 | End: 2025-06-10

## 2025-06-10 RX ORDER — SODIUM CHLORIDE 0.9 % (FLUSH) 0.9 %
10 SYRINGE (ML) INJECTION AS NEEDED
Status: DISCONTINUED | OUTPATIENT
Start: 2025-06-10 | End: 2025-06-10 | Stop reason: HOSPADM

## 2025-06-10 RX ADMIN — IOPAMIDOL 85 ML: 612 INJECTION, SOLUTION INTRAVENOUS at 14:05

## 2025-06-10 NOTE — PROGRESS NOTES
Office Note     Name: Monica Condon    : 1980     MRN: [id number]     Chief Complaint  Abdominal Pain (RLQ, said MRI showed there was a cyst, not sure If it ruptured. Wondering about appendix), Back Pain (RLQ, last few months), and Urinary Urgency (Kept having the urge to pee, but there was no relief. On the way here had a movement she felt in her abdomen and the pain has eased.)    Subjective     History of Present Illness:  Monica Condon is a 44 y.o. female who presents today for acute RLQ pain that began at 5 am this morning. She has no hx of abdominal surgeries. She reports she felt extreme pressure in her abdomen and felt the need to urinate. Patient states she felt the pain in her RLQ of abdomen and her right flank. She states the pain has been persistent, but has calmed down some now. Denies changes to bowel movements, fevers or chills. No burning with urination. Not currently menstruating. She has felt nauseous, but no vomiting. Denies URI symptoms. States that she has had chronic low back pain but this normally midline. She also has possible cyst on the right adnexa that was recently seen on MRI lumbar spine incidentally. She states she almost took herself to the ER due to the pain. Denies blood in her urine. Feels  she is urinating a normal amount and has no decreased stream.     Review of Systems:   Review of Systems   Constitutional:  Negative for chills and fever.   Respiratory:  Negative for shortness of breath.    Gastrointestinal:  Positive for abdominal pain and nausea. Negative for blood in stool, diarrhea and vomiting.   Genitourinary:  Positive for pelvic pain, pelvic pressure and urgency. Negative for dysuria, hematuria, vaginal bleeding and vaginal discharge.   Neurological:  Negative for dizziness and light-headedness.       Past Medical History:   Past Medical History:   Diagnosis Date    Allergic     Anxiety     Arthritis     Dyspepsia     Dysphagia     Fatigue      Frequent UTI     pending Urology eval    Gallbladder disease     abnormal imaging    GERD (gastroesophageal reflux disease)     Hyperlipidemia 2024    Take omega    Hypertension     While pregnant and thereafter    IBS (irritable bowel syndrome)     Joint pain     Low back pain     Morbid obesity     Urinary incontinence        Past Surgical History:   Past Surgical History:   Procedure Laterality Date    COLONOSCOPY  2017    DILATATION AND CURETTAGE  2016       Family History:   Family History   Problem Relation Age of Onset    Diabetes Mother 70    Hypertension Mother     Heart attack Mother 80    Heart disease Mother     Diabetes Father 60    Hypertension Father     Heart attack Father 75    Obesity Sister     Diabetes Sister     Drug abuse Sister     Lung cancer Maternal Grandfather     Stroke Maternal Aunt        Social History:   Social History     Socioeconomic History    Marital status:    Tobacco Use    Smoking status: Never    Smokeless tobacco: Never   Vaping Use    Vaping status: Never Used   Substance and Sexual Activity    Alcohol use: Yes     Alcohol/week: 1.0 standard drink of alcohol     Types: 1 Drinks containing 0.5 oz of alcohol per week     Comment: Maybe 1-2 a month    Drug use: Never    Sexual activity: Yes     Partners: Female     Birth control/protection: Partner of same sex       Immunizations:   Immunization History   Administered Date(s) Administered    COVID-19 (PFIZER) Purple Cap Monovalent 07/26/2021, 08/20/2021        Medications:     Current Outpatient Medications:     aspirin 81 MG EC tablet, Take 1 tablet by mouth Daily., Disp: , Rfl:     Biotin 1000 MCG tablet, Take 1,000 mcg by mouth 3 (Three) Times a Day., Disp: , Rfl:     busPIRone (BUSPAR) 10 MG tablet, Take 1 tablet by mouth 2 (Two) Times a Day., Disp: 60 tablet, Rfl: 5    clemastine (TAVIST) 2.68 MG tablet, Take 1 tablet by mouth Daily., Disp: , Rfl:     diclofenac (VOLTAREN) 75 MG EC tablet, Take 1 tablet by mouth 2  "(Two) Times a Day., Disp: , Rfl:     Diclofenac Sodium (VOLTAREN) 1 % gel gel, Apply 4 g topically to the appropriate area as directed 4 (Four) Times a Day As Needed (pain)., Disp: 100 g, Rfl: 5    dicyclomine (BENTYL) 10 MG capsule, Take 1 capsule by mouth 2 (Two) Times a Day., Disp: , Rfl:     famotidine (PEPCID) 40 MG tablet, Take 1 tablet by mouth At Night As Needed., Disp: , Rfl:     lansoprazole (PREVACID) 30 MG capsule, Take 1 capsule by mouth Daily., Disp: , Rfl:     methocarbamol (ROBAXIN) 500 MG tablet, Take 1 tablet by mouth 3 (Three) Times a Day As Needed for Muscle Spasms., Disp: 60 tablet, Rfl: 0    metoprolol succinate XL (TOPROL-XL) 100 MG 24 hr tablet, Take 1 tablet by mouth 2 (Two) Times a Day., Disp: , Rfl:     multivitamin with minerals tablet tablet, Take 2 tablets by mouth Daily., Disp: , Rfl:     Semaglutide-Weight Management 0.5 MG/0.5ML solution auto-injector, Inject 0.5 mL under the skin into the appropriate area as directed 1 (One) Time Per Week., Disp: 2 mL, Rfl: 0    sertraline (ZOLOFT) 100 MG tablet, Take 2 tablets by mouth every night at bedtime., Disp: , Rfl:   No current facility-administered medications for this visit.    Facility-Administered Medications Ordered in Other Visits:     [COMPLETED] Insert Peripheral IV, , , Once **AND** sodium chloride 0.9 % flush 10 mL, 10 mL, Intravenous, PRN, [place], Shamir A, APRN    Allergies:   Allergies   Allergen Reactions    Lidocaine Swelling    Shellfish-Derived Products Swelling       Objective     Vital Signs  /90 (BP Location: Right arm, Patient Position: Sitting, Cuff Size: Adult)   Pulse 64   Temp 98.1 °F (36.7 °C) (Temporal)   Ht 168.9 cm (66.5\")   Wt 127 kg (281 lb)   SpO2 100%   BMI 44.68 kg/m²   Estimated body mass index is 44.68 kg/m² as calculated from the following:    Height as of this encounter: 168.9 cm (66.5\").    Weight as of this encounter: 127 kg (281 lb).          Physical Exam  Vitals reviewed.   Constitutional: "       Appearance: Normal appearance. She is obese.   HENT:      Head: Normocephalic and atraumatic.   Eyes:      Pupils: Pupils are equal, round, and reactive to light.   Cardiovascular:      Rate and Rhythm: Normal rate and regular rhythm.      Pulses: Normal pulses.      Heart sounds: Normal heart sounds.   Pulmonary:      Effort: Pulmonary effort is normal.      Breath sounds: Normal breath sounds.   Abdominal:      General: Abdomen is flat. Bowel sounds are normal. There is no distension.      Tenderness: There is abdominal tenderness in the right lower quadrant. There is guarding (RLQ). There is no right CVA tenderness, left CVA tenderness or rebound. Negative signs include Galan's sign.   Skin:     General: Skin is warm.   Neurological:      General: No focal deficit present.      Mental Status: She is alert and oriented to person, place, and time.   Psychiatric:         Mood and Affect: Mood normal.          Results:  Recent Results (from the past 24 hours)   POC Urinalysis Dipstick, Automated    Collection Time: 06/10/25 11:29 AM    Specimen: Urine   Result Value Ref Range    Color Yellow Yellow, Straw, Dark Yellow, Merari    Clarity, UA Clear Clear    Specific Gravity  1.010 1.005 - 1.030    pH, Urine 6.0 5.0 - 8.0    Leukocytes Negative Negative    Nitrite, UA Negative Negative    Protein, POC Negative Negative mg/dL    Glucose, UA Negative Negative mg/dL    Ketones, UA Negative Negative    Urobilinogen, UA 0.2 E.U./dL Normal, 0.2 E.U./dL    Bilirubin Negative Negative    Blood, UA 3+ (A) Negative    Lot Number [id number]     Expiration Date 06.02.2026    Comprehensive Metabolic Panel    Collection Time: 06/10/25 12:08 PM    Specimen: Blood   Result Value Ref Range    Glucose 94 65 - 99 mg/dL    BUN 15.9 6.0 - 20.0 mg/dL    Creatinine 0.83 0.57 - 1.00 mg/dL    Sodium 136 136 - 145 mmol/L    Potassium 4.8 3.5 - 5.2 mmol/L    Chloride 104 98 - 107 mmol/L    CO2 20.0 (L) 22.0 - 29.0 mmol/L    Calcium 9.1  8.6 - 10.5 mg/dL    Total Protein 6.6 6.0 - 8.5 g/dL    Albumin 3.9 3.5 - 5.2 g/dL    ALT (SGPT) 26 1 - 33 U/L    AST (SGOT) 37 (H) 1 - 32 U/L    Alkaline Phosphatase 60 39 - 117 U/L    Total Bilirubin 0.7 0.0 - 1.2 mg/dL    Globulin 2.7 gm/dL    A/G Ratio 1.4 g/dL    BUN/Creatinine Ratio 19.2 7.0 - 25.0    Anion Gap 12.0 5.0 - 15.0 mmol/L    eGFR 88.7 >60.0 mL/min/1.73   Lipase    Collection Time: 06/10/25 12:08 PM    Specimen: Blood   Result Value Ref Range    Lipase 28 13 - 60 U/L   CBC Auto Differential    Collection Time: 06/10/25 12:08 PM    Specimen: Blood   Result Value Ref Range    WBC 7.02 3.40 - 10.80 10*3/mm3    RBC 4.21 3.77 - 5.28 10*6/mm3    Hemoglobin 12.7 12.0 - 15.9 g/dL    Hematocrit 38.5 34.0 - 46.6 %    MCV 91.4 79.0 - 97.0 fL    MCH 30.2 26.6 - 33.0 pg    MCHC 33.0 31.5 - 35.7 g/dL    RDW 12.7 12.3 - 15.4 %    RDW-SD 41.8 37.0 - 54.0 fl    MPV 10.7 6.0 - 12.0 fL    Platelets 177 140 - 450 10*3/mm3    Neutrophil % 57.8 42.7 - 76.0 %    Lymphocyte % 30.6 19.6 - 45.3 %    Monocyte % 7.1 5.0 - 12.0 %    Eosinophil % 3.6 0.3 - 6.2 %    Basophil % 0.6 0.0 - 1.5 %    Immature Grans % 0.3 0.0 - 0.5 %    Neutrophils, Absolute 4.06 1.70 - 7.00 10*3/mm3    Lymphocytes, Absolute 2.15 0.70 - 3.10 10*3/mm3    Monocytes, Absolute 0.50 0.10 - 0.90 10*3/mm3    Eosinophils, Absolute 0.25 0.00 - 0.40 10*3/mm3    Basophils, Absolute 0.04 0.00 - 0.20 10*3/mm3    Immature Grans, Absolute 0.02 0.00 - 0.05 10*3/mm3    nRBC 0.0 0.0 - 0.2 /100 WBC   Scan Slide    Collection Time: 06/10/25 12:08 PM    Specimen: Blood   Result Value Ref Range    RBC Morphology Normal Normal    WBC Morphology Normal Normal    Clumped Platelets Present None Seen   Urinalysis With Microscopic If Indicated (No Culture) - Urine, Clean Catch    Collection Time: 06/10/25 12:31 PM    Specimen: Urine, Clean Catch   Result Value Ref Range    Color, UA Dark Yellow (A) Yellow, Straw    Appearance, UA Clear Clear    pH, UA 6.5 5.0 - 8.0    Specific  Gravity, UA 1.007 1.001 - 1.030    Glucose, UA Negative Negative    Ketones, UA Negative Negative    Bilirubin, UA Negative Negative    Blood, UA Negative Negative    Protein, UA Negative Negative    Leuk Esterase, UA Negative Negative    Nitrite, UA Negative Negative    Urobilinogen, UA 0.2 E.U./dL 0.2 - 1.0 E.U./dL   POC Urine Pregnancy    Collection Time: 06/10/25 12:31 PM    Specimen: Urine   Result Value Ref Range    HCG, Urine, QL Negative     Lot Number [id number]     Internal Positive Control Passed     Internal Negative Control Negative     Expiration Date 2026-10-17         Assessment and Plan     Assessment/Plan:  Diagnoses and all orders for this visit:    1. RLQ abdominal pain (Primary)  -     POC Urinalysis Dipstick, Automated    2. Right flank pain    3. Hematuria, unspecified type    On exam patient has guarding localized to her RLQ. No CVA tenderness.  Differential includes appendicitis, kidney stones, UTI, ruptured ovarian cyst.    Advised the patient to seek evaluation in the ER immediately to rule out appendicitis. Explained the risk of not addressing this quickly including fatality. She was agreeable and states she will go to the ER now. No further questions.   Requested to drive herself via private vehicle.       Follow Up  No follow-ups on file.    Ann Millan PA-C   [facility]

## 2025-06-10 NOTE — DISCHARGE INSTRUCTIONS
Return to the ED for new or concerning symptoms.  Follow-up with your primary care as needed.  You have a right adrenal gland cyst on top of the kidney that measures 1.6 cm smaller than when your MRI was performed.  Follow-up can be performed as directed by your primary care provider.

## 2025-06-10 NOTE — ED PROVIDER NOTES
Subjective   History of Present Illness  Patient is a 44-year-old female who presents with acute right flank and right lower quadrant pain at 5 AM this morning she awoke from sleep with the urge to void.  She went to the bathroom and did so, but continued feeling urgency, and more severe pain in the right lower quadrant, went to a primary care appointment she was able to schedule.  They referred her on to the ED after testing urine noting blood.  Patient reports en route to the hospital she felt as if something moved in her groin area and the pain has vastly improved.  She denies vomiting.  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  She is awake, alert, nontoxic-appearing.    Review of Systems   Constitutional: Negative.    Respiratory: Negative.     Cardiovascular: Negative.    Gastrointestinal:  Positive for abdominal pain. Negative for blood in stool, constipation and vomiting.   Genitourinary:  Positive for dysuria, flank pain and urgency.   Skin: Negative.    Neurological: Negative.        Past Medical History:   Diagnosis Date    Allergic     Anxiety     Arthritis 2022    Dyspepsia     Dysphagia     Fatigue     Frequent UTI     pending Urology eval    Gallbladder disease     abnormal imaging    GERD (gastroesophageal reflux disease)     Hyperlipidemia 2024    Take omega    Hypertension     While pregnant and thereafter    IBS (irritable bowel syndrome)     Joint pain     Low back pain     Morbid obesity     Urinary incontinence        Allergies   Allergen Reactions    Lidocaine Swelling    Shellfish-Derived Products Swelling       Past Surgical History:   Procedure Laterality Date    COLONOSCOPY  2017    DILATATION AND CURETTAGE  2016       Family History   Problem Relation Age of Onset    Diabetes Mother 70    Hypertension Mother     Heart attack Mother 80    Heart disease Mother     Diabetes Father 60     Hypertension Father     Heart attack Father 75    Obesity Sister     Diabetes Sister     Drug abuse Sister     Lung cancer Maternal Grandfather     Stroke Maternal Aunt        Social History     Socioeconomic History    Marital status:    Tobacco Use    Smoking status: Never    Smokeless tobacco: Never   Vaping Use    Vaping status: Never Used   Substance and Sexual Activity    Alcohol use: Yes     Alcohol/week: 1.0 standard drink of alcohol     Types: 1 Drinks containing 0.5 oz of alcohol per week     Comment: Maybe 1-2 a month    Drug use: Never    Sexual activity: Yes     Partners: Female     Birth control/protection: Partner of same sex           Objective   Physical Exam  Constitutional:       General: She is not in acute distress.     Appearance: She is well-developed. She is obese. She is not toxic-appearing.   HENT:      Head: Normocephalic and atraumatic.   Eyes:      Extraocular Movements: Extraocular movements intact.   Cardiovascular:      Rate and Rhythm: Normal rate.   Pulmonary:      Effort: Pulmonary effort is normal.   Abdominal:      General: Bowel sounds are normal.      Palpations: Abdomen is soft.      Tenderness: There is abdominal tenderness in the right lower quadrant. There is no right CVA tenderness or left CVA tenderness.   Skin:     General: Skin is warm and dry.      Capillary Refill: Capillary refill takes less than 2 seconds.   Neurological:      General: No focal deficit present.      Mental Status: She is alert and oriented to person, place, and time.   Psychiatric:         Mood and Affect: Mood normal.         Behavior: Behavior normal.         Procedures           ED Course  ED Course as of 06/10/25 1450   Tue Hugo 10, 2025   1153 Reviewed patient's vital signs, slight hypertension, stable. [JH]   1155 Initial evaluation the patient results waiting room 1. [JH]   1232 UPT is negative.  Marked ready for CT scan. [JH]   1301 CBC and urinalysis normal. [JH]   1400 Serum  chemistries nonactionable. []   1449 CT imaging with no acute abdominal pelvic abnormality.  1.6 cm right adrenal cyst. []      ED Course User Index  [] Shamir Macias APRN                                                       Medical Decision Making  Given patient's complaints, acute chronicity, prior history and my exam, differential includes renal calculi, hydronephrosis, obstructive uropathy cannot be excluded, as well as urinary tract infection, appendicitis, diverticulitis, pelvic cyst, abdominal mass.  Patient is agreeable to urinalysis, serum labs, CT imaging study.  She declines analgesia and antiemetic medication at this time.  We will reevaluate, communicate workup results and plan her disposition.    Amount and/or Complexity of Data Reviewed  Labs: ordered.  Radiology: ordered.    Risk  Prescription drug management.        Final diagnoses:   Acute right flank pain   Adrenal cyst   Diverticulosis       ED Disposition  ED Disposition       ED Disposition   Discharge    Condition   Stable    Comment   --               Ute Guzman DO  [address] [id number]  [phone]      As needed         Medication List      No changes were made to your prescriptions during this visit.            Shamir Macias APRN  06/10/25 2013

## 2025-06-23 ENCOUNTER — OFFICE VISIT (OUTPATIENT)
Dept: FAMILY MEDICINE CLINIC | Facility: CLINIC | Age: 45
End: 2025-06-23
Payer: COMMERCIAL

## 2025-06-23 VITALS
SYSTOLIC BLOOD PRESSURE: 136 MMHG | OXYGEN SATURATION: 98 % | BODY MASS INDEX: 44.04 KG/M2 | HEIGHT: 67 IN | HEART RATE: 89 BPM | WEIGHT: 280.6 LBS | DIASTOLIC BLOOD PRESSURE: 96 MMHG

## 2025-06-23 DIAGNOSIS — M79.672 LEFT FOOT PAIN: ICD-10-CM

## 2025-06-23 DIAGNOSIS — M54.41 CHRONIC BILATERAL LOW BACK PAIN WITH RIGHT-SIDED SCIATICA: ICD-10-CM

## 2025-06-23 DIAGNOSIS — M18.11 OSTEOARTHRITIS OF RIGHT THUMB: ICD-10-CM

## 2025-06-23 DIAGNOSIS — Z12.83 SKIN CANCER SCREENING: ICD-10-CM

## 2025-06-23 DIAGNOSIS — G56.03 BILATERAL CARPAL TUNNEL SYNDROME: ICD-10-CM

## 2025-06-23 DIAGNOSIS — D22.9 MULTIPLE NEVI: ICD-10-CM

## 2025-06-23 DIAGNOSIS — M51.362 DEGENERATION OF INTERVERTEBRAL DISC OF LUMBAR REGION WITH DISCOGENIC BACK PAIN AND LOWER EXTREMITY PAIN: ICD-10-CM

## 2025-06-23 DIAGNOSIS — E66.813 CLASS 3 SEVERE OBESITY WITH SERIOUS COMORBIDITY AND BODY MASS INDEX (BMI) OF 45.0 TO 49.9 IN ADULT, UNSPECIFIED OBESITY TYPE: Primary | ICD-10-CM

## 2025-06-23 DIAGNOSIS — G89.29 CHRONIC BILATERAL LOW BACK PAIN WITH RIGHT-SIDED SCIATICA: ICD-10-CM

## 2025-06-23 PROCEDURE — 3075F SYST BP GE 130 - 139MM HG: CPT | Performed by: STUDENT IN AN ORGANIZED HEALTH CARE EDUCATION/TRAINING PROGRAM

## 2025-06-23 PROCEDURE — 1125F AMNT PAIN NOTED PAIN PRSNT: CPT | Performed by: STUDENT IN AN ORGANIZED HEALTH CARE EDUCATION/TRAINING PROGRAM

## 2025-06-23 PROCEDURE — 99215 OFFICE O/P EST HI 40 MIN: CPT | Performed by: STUDENT IN AN ORGANIZED HEALTH CARE EDUCATION/TRAINING PROGRAM

## 2025-06-23 PROCEDURE — 3080F DIAST BP >= 90 MM HG: CPT | Performed by: STUDENT IN AN ORGANIZED HEALTH CARE EDUCATION/TRAINING PROGRAM

## 2025-06-23 RX ORDER — SENNOSIDES 8.6 MG
650 CAPSULE ORAL EVERY 8 HOURS PRN
Qty: 60 TABLET | Refills: 1 | Status: SHIPPED | OUTPATIENT
Start: 2025-06-23

## 2025-06-23 NOTE — PROGRESS NOTES
Established Office Visit      Patient Name: Monica Condon  : 1980   MRN: [id number]   Care Team: Patient Care Team:  Ute Guzman DO as PCP - General (Internal Medicine)    Chief Complaint:    Chief Complaint   Patient presents with   • Back Pain     Follow up taking methocarbamol    • Anxiety     Takes busPIRone       History of Present Illness: Monica Condon is a 44 y.o. female  with IBS, GERD, HTN, anxiety, obesity who is here today to follow up with concerns as detailed below.     She was last seen about 6 weeks ago.    Anxiety uncontrolled at that time, buspar was increased to 10mg BID - this has been helpful and she feels symptoms are well controlled now.     She went to the ER on 6/10 for inability to empty bladder, right sided back pain wrapping around her side and into her RLQ abdomen. Pain was sharp. Unable to find a comfortable position. CT A/P unrevealing but by the time she had this image she was pain free. Symptoms were thought to be consistent with stone passage although she did not see this in her urine. Still struggles with some low back pain but not comparable to pain she felt at that time.    She is currently taking diclofenac but has replaced her BID dosing with tylenol and this has been helpful. Chronic low back pain persists. She has upcoming appointment to establish with NS.    She reports right thumb pain, base of thumb. Admits to weakness at times and prohibits her from doing certain activities involving her hand. She has had bilateral carpal tunnel release in the past. She denies numbness or tingling. She requests referral to [facility] orthopedics.    She reports left foot pain at the top of her foot. Has followed with podiatrist in the past 1-2 years ago for this chronic pain. She was advised to wear a boot at that time but did not find this comfortable and it hurt her hip when she wore it. Denies foot swelling. Pain is intermittent, sharp, and sudden. No  radiation. Pain lasts for hours once it is flared up. She requests referral to [facility] for this as well.    Subjective      Review of Systems:   Review of Systems - See HPI    I have reviewed and the following portions of the patient's history were updated as appropriate: past family history, past medical history, past social history, past surgical history and problem list.    Medications:     Current Outpatient Medications:   •  aspirin 81 MG EC tablet, Take 1 tablet by mouth Daily., Disp: , Rfl:   •  Biotin 1000 MCG tablet, Take 1,000 mcg by mouth 3 (Three) Times a Day., Disp: , Rfl:   •  busPIRone (BUSPAR) 10 MG tablet, Take 1 tablet by mouth 2 (Two) Times a Day., Disp: 60 tablet, Rfl: 5  •  clemastine (TAVIST) 2.68 MG tablet, Take 1 tablet by mouth Daily., Disp: , Rfl:   •  diclofenac (VOLTAREN) 75 MG EC tablet, Take 1 tablet by mouth Daily., Disp: , Rfl:   •  Diclofenac Sodium (VOLTAREN) 1 % gel gel, Apply 4 g topically to the appropriate area as directed 4 (Four) Times a Day As Needed (pain)., Disp: 100 g, Rfl: 5  •  dicyclomine (BENTYL) 10 MG capsule, Take 1 capsule by mouth 2 (Two) Times a Day., Disp: , Rfl:   •  famotidine (PEPCID) 40 MG tablet, Take 1 tablet by mouth At Night As Needed., Disp: , Rfl:   •  lansoprazole (PREVACID) 30 MG capsule, Take 1 capsule by mouth Daily., Disp: , Rfl:   •  methocarbamol (ROBAXIN) 500 MG tablet, Take 1 tablet by mouth 3 (Three) Times a Day As Needed for Muscle Spasms., Disp: 60 tablet, Rfl: 0  •  metoprolol succinate XL (TOPROL-XL) 100 MG 24 hr tablet, Take 1 tablet by mouth 2 (Two) Times a Day., Disp: , Rfl:   •  multivitamin with minerals tablet tablet, Take 2 tablets by mouth Daily., Disp: , Rfl:   •  sertraline (ZOLOFT) 100 MG tablet, Take 2 tablets by mouth every night at bedtime., Disp: , Rfl:   •  acetaminophen (Tylenol 8 Hour) 650 MG 8 hr tablet, Take 1 tablet by mouth Every 8 (Eight) Hours As Needed for Mild Pain., Disp: 60 tablet, Rfl: 1  •   "Tirzepatide-Weight Management (ZEPBOUND) 2.5 MG/0.5ML solution auto-injector, Inject 0.5 mL under the skin into the appropriate area as directed 1 (One) Time Per Week., Disp: 2 mL, Rfl: 0    Allergies:   Allergies   Allergen Reactions   • Lidocaine Swelling   • Shellfish-Derived Products Swelling       Objective     Physical Exam:  Vital Signs:   Vitals:    06/23/25 1444   BP: 136/96   Pulse: 89   SpO2: 98%   Weight: 127 kg (280 lb 9.6 oz)   Height: 170.2 cm (67\")     Body mass index is 43.95 kg/m².     Physical Exam  Vitals reviewed.   Constitutional:       Appearance: Normal appearance.   Cardiovascular:      Rate and Rhythm: Normal rate.      Pulses: Normal pulses.   Pulmonary:      Effort: Pulmonary effort is normal. No respiratory distress.   Musculoskeletal:      Comments: Left foot is without deformity, discoloration or swelling. ROM normal.    Skin:     General: Skin is warm and dry.   Neurological:      Mental Status: She is alert.   Psychiatric:         Mood and Affect: Mood normal.         Behavior: Behavior normal.         Judgment: Judgment normal.         Assessment / Plan      Assessment/Plan:   Problems Addressed This Visit  Diagnoses and all orders for this visit:    1. Class 3 severe obesity with serious comorbidity and body mass index (BMI) of 45.0 to 49.9 in adult, unspecified obesity type (Primary)  -     Tirzepatide-Weight Management (ZEPBOUND) 2.5 MG/0.5ML solution auto-injector; Inject 0.5 mL under the skin into the appropriate area as directed 1 (One) Time Per Week.  Dispense: 2 mL; Refill: 0    2. Degeneration of intervertebral disc of lumbar region with discogenic back pain and lower extremity pain  -     acetaminophen (Tylenol 8 Hour) 650 MG 8 hr tablet; Take 1 tablet by mouth Every 8 (Eight) Hours As Needed for Mild Pain.  Dispense: 60 tablet; Refill: 1    3. Chronic bilateral low back pain with right-sided sciatica  -     acetaminophen (Tylenol 8 Hour) 650 MG 8 hr tablet; Take 1 tablet " by mouth Every 8 (Eight) Hours As Needed for Mild Pain.  Dispense: 60 tablet; Refill: 1    4. Skin cancer screening  -     Ambulatory Referral to Dermatology    5. Multiple nevi  -     Ambulatory Referral to Dermatology    6. Bilateral carpal tunnel syndrome  -     Ambulatory Referral to Orthopedic Surgery    7. Osteoarthritis of right thumb  -     Ambulatory Referral to Orthopedic Surgery    8. Left foot pain  -     Ambulatory Referral to Orthopedic Surgery          Discussed importance of preventative care including vaccinations, age appropriate cancer screening, routine lab work, healthy diet, and active lifestyle.      Obesity - Patient has tried to lose weight for several months without significant success with lifestyle modifications, calorie reduction, and increased physical activity alone. She has tried weight watchers, atkins diet/keto, intermittent fasting, isagenix without benefit. Their elevated BMI of 45 with coexisting HTN, HLD puts them at increased risk for developing cardiovascular disease, ROSALBA, T2DM, metabolic syndrome, among others. Patient would benefit  from medication to assist with weight loss. We will try Zepbound in addition to current dietary and exercise modifications. Discussed potential side effects and patient will let me know if they experiences these. We discussed importance of uptitrating dose monthly to avoid GI side effects and they expressed understanding.  No personal or family history of MEN2 syndrome, pancreatitis, medullary thyroid cancer.      Anxiety - improved with zoloft 200mg daily and buspar 10mg BID      HTN - elevated today but home readings at goal <130/80. Continue metoprolol BID      Low back pain with sciatica unresponsive to previous NSAID, PT, muscle relaxer trials - has appointment to establish with NS next week.     GERD - continue PPI and H2 blocker, per [facility]    Plan of care reviewed with patient at the conclusion of today's visit. Education was provided  regarding diagnosis and management.  Patient verbalizes understanding of and agreement with management plan.    Follow Up:   Return in about 8 weeks (around 8/18/2025) for Recheck .    I spent 42 minutes caring for Monica on this date of service. This time includes time spent by me in the following activities:preparing for the visit, reviewing tests, obtaining and/or reviewing a separately obtained history, performing a medically appropriate examination and/or evaluation , counseling and educating the patient/family/caregiver, ordering medications, tests, or procedures, referring and communicating with other health care professionals , and documenting information in the medical record    DO JAI Fitzpatrick PC ASHISH CHAUDHARI  [facility]  [address]  [facility] [address]  Fax [phone]  Phone [phone]

## 2025-06-25 ENCOUNTER — PRIOR AUTHORIZATION (OUTPATIENT)
Dept: FAMILY MEDICINE CLINIC | Facility: CLINIC | Age: 45
End: 2025-06-25

## 2025-06-25 NOTE — TELEPHONE ENCOUNTER
(Key: [id number]) - [id number]  Zepbound 2.5MG/0.5ML pen-injectors  status: PA RequestCreated: June 23rd, 2025 [phone]: June 25th, 2025    Message from Plan  This request has not been approved. Based on the information we received, you did not meet the specific rule(s) needed to approve this request. In some cases, the requested drug or alternatives offered may have other guideline rules that need to be met. Our guideline named ZEPBOUND requires the following rule(s) be met for approval:A. Documentation has been submitted (such as progress notes) showing that the medication is being prescribed for moderate to severe obstructive sleep apnea (ROSALBA) [a type of sleep condition with difficulty breathing] confirmed by one of the following:I. Apnea Hypopnea Index/Respiratory Disturbance Index/Respiratory Event Index (AHI/RDI/YOANDY) [a way of measuring sleep apnea] greater than or equal to 15 events/hour that is predominantly obstructive.II. AHI/RDI/YOANDY greater than or equal to 5 with at least ONE typical ROSALBA symptom (for example, unrefreshing sleep, daytime sleepiness, fatigue [feeling tired], or insomnia [a type of sleep condition], awakening with a gasping or choking sensation, loud snoring, or witnessed apneas).III. Please note: Zepbound used for weight loss is considered a benefit exclusion pursuant to the Social Security Act 1927(D)(2)This is why your request is not approved. Please work with your doctor to use a different medication or get us more information if it will allow us to approve this request. Please note that there are additional requirements for the above listed diagnosis. A written notification letter will follow with additional details.    [facility]  [employer]  [address] [place] [address]  or  Fax [phone]

## 2025-06-25 NOTE — TELEPHONE ENCOUNTER
(Key: [id number])    Zepbound 2.5MG/0.5ML pen-injectors  Form  [employer] [place] Medicaid ePA Form 2017 NCPDP      Information regarding your request  A previously denied or partially denied PA request has been located for this member. To initiate an appeal or reconsideration please call [phone]. Thank you.

## 2025-06-30 ENCOUNTER — PATIENT MESSAGE (OUTPATIENT)
Dept: FAMILY MEDICINE CLINIC | Facility: CLINIC | Age: 45
End: 2025-06-30
Payer: COMMERCIAL

## 2025-06-30 ENCOUNTER — OFFICE VISIT (OUTPATIENT)
Dept: NEUROSURGERY | Facility: CLINIC | Age: 45
End: 2025-06-30
Payer: COMMERCIAL

## 2025-06-30 VITALS — TEMPERATURE: 97.3 F | WEIGHT: 280.1 LBS | HEIGHT: 67 IN | BODY MASS INDEX: 43.96 KG/M2

## 2025-06-30 DIAGNOSIS — M47.816 LUMBAR SPONDYLOSIS: Primary | ICD-10-CM

## 2025-06-30 DIAGNOSIS — M46.1 SI (SACROILIAC) JOINT INFLAMMATION: ICD-10-CM

## 2025-06-30 DIAGNOSIS — M51.362 DEGENERATION OF INTERVERTEBRAL DISC OF LUMBAR REGION WITH DISCOGENIC BACK PAIN AND LOWER EXTREMITY PAIN: ICD-10-CM

## 2025-06-30 DIAGNOSIS — M54.41 CHRONIC BILATERAL LOW BACK PAIN WITH RIGHT-SIDED SCIATICA: ICD-10-CM

## 2025-06-30 DIAGNOSIS — G89.29 CHRONIC BILATERAL LOW BACK PAIN WITH RIGHT-SIDED SCIATICA: ICD-10-CM

## 2025-06-30 NOTE — PROGRESS NOTES
Patient: Monica Condon  : 1980    Primary Care Provider: Ute Guzman DO    Requesting Provider: As above      Chief Complaint: Back Pain and Leg Pain      History of Present Illness: This is a 44 y.o. female who presents with longstanding lower back pain, but also right leg pain which started a few months ago.  The patient states she has dealt with a dull achy pain in her back for years, but more recently has been dealing with a achy and tingling type pain that goes down the right leg.  She feels it more on the lateral portion of the leg and in addition to the pain, feels like her right leg is somewhat weaker.  She denies any left lower extremity symptoms or bowel or bladder incontinence.  She has been taking over-the-counter medications, but has never had any formal physical therapy on her back or epidural injections.    PMHX  Allergies:  Allergies   Allergen Reactions    Lidocaine Swelling    Shellfish-Derived Products Swelling     Medications    Current Outpatient Medications:     acetaminophen (Tylenol 8 Hour) 650 MG 8 hr tablet, Take 1 tablet by mouth Every 8 (Eight) Hours As Needed for Mild Pain. (Patient taking differently: Take 1 tablet by mouth Every 8 (Eight) Hours As Needed for Mild Pain. 2 BID), Disp: 60 tablet, Rfl: 1    aspirin 81 MG EC tablet, Take 1 tablet by mouth Daily., Disp: , Rfl:     Biotin 1000 MCG tablet, Take 1,000 mcg by mouth 3 (Three) Times a Day., Disp: , Rfl:     busPIRone (BUSPAR) 10 MG tablet, Take 1 tablet by mouth 2 (Two) Times a Day., Disp: 60 tablet, Rfl: 5    clemastine (TAVIST) 2.68 MG tablet, Take 1 tablet by mouth Daily., Disp: , Rfl:     diclofenac (VOLTAREN) 75 MG EC tablet, Take 1 tablet by mouth Daily., Disp: , Rfl:     Diclofenac Sodium (VOLTAREN) 1 % gel gel, Apply 4 g topically to the appropriate area as directed 4 (Four) Times a Day As Needed (pain)., Disp: 100 g, Rfl: 5    dicyclomine (BENTYL) 10 MG capsule, Take 1 capsule by mouth 2 (Two) Times a  Day., Disp: , Rfl:     famotidine (PEPCID) 40 MG tablet, Take 1 tablet by mouth At Night As Needed., Disp: , Rfl:     lansoprazole (PREVACID) 30 MG capsule, Take 1 capsule by mouth Daily., Disp: , Rfl:     methocarbamol (ROBAXIN) 500 MG tablet, Take 1 tablet by mouth 3 (Three) Times a Day As Needed for Muscle Spasms., Disp: 60 tablet, Rfl: 0    metoprolol succinate XL (TOPROL-XL) 100 MG 24 hr tablet, Take 1 tablet by mouth 2 (Two) Times a Day., Disp: , Rfl:     multivitamin with minerals tablet tablet, Take 2 tablets by mouth Daily., Disp: , Rfl:     sertraline (ZOLOFT) 100 MG tablet, Take 2 tablets by mouth every night at bedtime., Disp: , Rfl:   Past Medical History:  Past Medical History:   Diagnosis Date    Allergic     Anxiety     Arthritis 2022    Claustrophobia     CTS (carpal tunnel syndrome)     Surgery 2016    Dyspepsia     Dysphagia     Fatigue     Frequent UTI     pending Urology eval    Gallbladder disease     abnormal imaging    GERD (gastroesophageal reflux disease)     Hyperlipidemia 2024    Take omega    Hypertension     While pregnant and thereafter    IBS (irritable bowel syndrome)     Joint pain     Low back pain     Lumbosacral disc disease     Morbid obesity     Urinary incontinence      Past Surgical History:  Past Surgical History:   Procedure Laterality Date    CARPAL TUNNEL RELEASE  2016    I binu thats the year    COLONOSCOPY  2017    DILATATION AND CURETTAGE  2016    EPIDURAL BLOCK  During labor     Social Hx:  Social History     Tobacco Use    Smoking status: Never     Passive exposure: Never    Smokeless tobacco: Never   Vaping Use    Vaping status: Never Used   Substance Use Topics    Alcohol use: Yes     Alcohol/week: 1.0 standard drink of alcohol     Types: 1 Drinks containing 0.5 oz of alcohol per week     Comment: Maybe 1-2 a month    Drug use: Never     Family Hx:  Family History   Problem Relation Age of Onset    Diabetes Mother 70    Hypertension Mother     Heart attack Mother  80    Heart disease Mother     Diabetes Father 60    Hypertension Father     Heart attack Father 75    Obesity Sister     Diabetes Sister     Drug abuse Sister     Lung cancer Maternal Grandfather     Stroke Maternal Aunt      Review of Systems:        Review of Systems   Constitutional:  Negative for activity change, appetite change, chills, diaphoresis, fatigue, fever and unexpected weight change.   HENT:  Negative for congestion, dental problem, drooling, ear discharge, ear pain, facial swelling, hearing loss, mouth sores, nosebleeds, postnasal drip, rhinorrhea, sinus pressure, sinus pain, sneezing, sore throat, tinnitus, trouble swallowing and voice change.    Eyes:  Negative for photophobia, pain, discharge, redness, itching and visual disturbance.   Respiratory:  Negative for apnea, cough, choking, chest tightness, shortness of breath, wheezing and stridor.    Cardiovascular:  Negative for chest pain, palpitations and leg swelling.   Gastrointestinal:  Negative for abdominal distention, abdominal pain, anal bleeding, blood in stool, constipation, diarrhea, nausea, rectal pain and vomiting.   Endocrine: Negative for cold intolerance, heat intolerance, polydipsia, polyphagia and polyuria.   Genitourinary:  Negative for decreased urine volume, difficulty urinating, dyspareunia, dysuria, enuresis, flank pain, frequency, genital sores, hematuria, menstrual problem, pelvic pain, urgency, vaginal bleeding, vaginal discharge and vaginal pain.   Musculoskeletal:  Positive for back pain. Negative for arthralgias, gait problem, joint swelling, myalgias, neck pain and neck stiffness.   Skin:  Negative for color change, pallor, rash and wound.   Allergic/Immunologic: Negative for environmental allergies, food allergies and immunocompromised state.   Neurological:  Positive for weakness. Negative for dizziness, tremors, seizures, syncope, facial asymmetry, speech difficulty, light-headedness, numbness and headaches.  "  Hematological:  Negative for adenopathy. Does not bruise/bleed easily.   Psychiatric/Behavioral:  Negative for agitation, behavioral problems, confusion, decreased concentration, dysphoric mood, hallucinations, self-injury, sleep disturbance and suicidal ideas. The patient is not nervous/anxious and is not hyperactive.         Physical Exam:   Temp 97.3 °F (36.3 °C) (Infrared)   Ht 170.2 cm (67\")   Wt 127 kg (280 lb 1.6 oz)   BMI 43.87 kg/m²   Awake, alert and oriented x 3  Speech f/c  Opens eyes spont  Pupils 3 mm rx bilaterally  Extraocular muscles intact bilaterally  Normal sensation to light touch in all 3 distributions of CN V bilaterally  Face symmetric bilaterally  Tongue midline  5/5 in all 4 ext  Negative straight leg test on the right  The right SI joint is tender to palpation    Diagnostic Studies:  All neurological imaging studies were independently reviewed unless stated otherwise    Assessment/Plan:  This is a 44 y.o. female presenting with chronic lower back and more recent right leg pain.  In reviewing the patient's lumbar MRI, she has moderate degenerative disc disease at L5-S1 but there is no significant disc bulge, herniation or nerve root compression.  She also has Modic changes seen at L5-S1.  Clinically, the patient's right SI joint is quite tender to palpation and could be a component to the pain that she is experiencing.  Given the lack of significant stenosis or nerve root compression seen on her imaging, I do not think that there is a role for surgical intervention at this point.  I am going to order her physical therapy and refer her to pain management as I think she is a good candidate for the Intracept procedure to try to help her back pain.  I also think she may find benefit from an SI joint injection.  We will have her obtain flexion-extension lumbar x-rays and follow-up with me in approximately 3 months to see how she is doing.    Diagnoses and all orders for this visit:    1. " Lumbar spondylosis (Primary)  -     Ambulatory Referral to Pain Management  -     Ambulatory Referral to Physical Therapy for Evaluation & Treatment  -     XR Spine Lumbar Flex & Ext; Future    2. SI (sacroiliac) joint inflammation  -     Ambulatory Referral to Pain Management  -     Ambulatory Referral to Physical Therapy for Evaluation & Treatment  -     XR Spine Lumbar Flex & Ext; Future      Monica Condon  reports that she has never smoked. She has never been exposed to tobacco smoke. She has never used smokeless tobacco.          Dickson Schofield MD  06/30/25  14:22 EDT

## 2025-07-01 RX ORDER — SENNOSIDES 8.6 MG
1300 CAPSULE ORAL 2 TIMES DAILY PRN
Qty: 120 TABLET | Refills: 1 | Status: SHIPPED | OUTPATIENT
Start: 2025-07-01

## 2025-07-16 ENCOUNTER — TELEPHONE (OUTPATIENT)
Dept: NEUROSURGERY | Facility: CLINIC | Age: 45
End: 2025-07-16
Payer: COMMERCIAL

## 2025-07-16 NOTE — TELEPHONE ENCOUNTER
PT CALLED: STATES SHE CALLED [facility] AND THEY TLD HER THEY DID NOT RECEIVE THE ORDER. PLEASE REFAX. THANKS!      PT CONTACT: [phone]  BEST TIME TO CALL: ANYTIME

## 2025-08-25 ENCOUNTER — TELEPHONE (OUTPATIENT)
Dept: FAMILY MEDICINE CLINIC | Facility: CLINIC | Age: 45
End: 2025-08-25
Payer: COMMERCIAL

## 2025-08-26 ENCOUNTER — LAB (OUTPATIENT)
Dept: LAB | Facility: HOSPITAL | Age: 45
End: 2025-08-26
Payer: COMMERCIAL

## 2025-08-26 ENCOUNTER — OFFICE VISIT (OUTPATIENT)
Dept: FAMILY MEDICINE CLINIC | Facility: CLINIC | Age: 45
End: 2025-08-26
Payer: COMMERCIAL

## 2025-08-26 VITALS
WEIGHT: 277.6 LBS | HEIGHT: 67 IN | OXYGEN SATURATION: 99 % | HEART RATE: 58 BPM | DIASTOLIC BLOOD PRESSURE: 84 MMHG | SYSTOLIC BLOOD PRESSURE: 134 MMHG | BODY MASS INDEX: 43.57 KG/M2

## 2025-08-26 DIAGNOSIS — E66.813 CLASS 3 SEVERE OBESITY WITH SERIOUS COMORBIDITY AND BODY MASS INDEX (BMI) OF 45.0 TO 49.9 IN ADULT, UNSPECIFIED OBESITY TYPE: ICD-10-CM

## 2025-08-26 DIAGNOSIS — J30.2 SEASONAL ALLERGIES: ICD-10-CM

## 2025-08-26 DIAGNOSIS — R25.2 MUSCLE CRAMPS: ICD-10-CM

## 2025-08-26 DIAGNOSIS — R20.2 NUMBNESS AND TINGLING OF LEFT LEG: ICD-10-CM

## 2025-08-26 DIAGNOSIS — B37.9 YEAST INFECTION: ICD-10-CM

## 2025-08-26 DIAGNOSIS — R35.0 URINARY FREQUENCY: Primary | ICD-10-CM

## 2025-08-26 DIAGNOSIS — R20.0 NUMBNESS AND TINGLING OF LEFT LEG: ICD-10-CM

## 2025-08-26 LAB
ALBUMIN SERPL-MCNC: 3.9 G/DL (ref 3.5–5.2)
ALBUMIN/GLOB SERPL: 1.2 G/DL
ALP SERPL-CCNC: 71 U/L (ref 39–117)
ALT SERPL W P-5'-P-CCNC: 21 U/L (ref 1–33)
ANION GAP SERPL CALCULATED.3IONS-SCNC: 11.3 MMOL/L (ref 5–15)
AST SERPL-CCNC: 19 U/L (ref 1–32)
BILIRUB BLD-MCNC: ABNORMAL MG/DL
BILIRUB SERPL-MCNC: 0.7 MG/DL (ref 0–1.2)
BUN SERPL-MCNC: 24 MG/DL (ref 6–20)
BUN/CREAT SERPL: 27.3 (ref 7–25)
CALCIUM SPEC-SCNC: 9.6 MG/DL (ref 8.6–10.5)
CHLORIDE SERPL-SCNC: 103 MMOL/L (ref 98–107)
CLARITY, POC: ABNORMAL
CO2 SERPL-SCNC: 22.7 MMOL/L (ref 22–29)
COLOR UR: ABNORMAL
CREAT SERPL-MCNC: 0.88 MG/DL (ref 0.57–1)
DEPRECATED RDW RBC AUTO: 41 FL (ref 37–54)
EGFRCR SERPLBLD CKD-EPI 2021: 82.7 ML/MIN/1.73
ERYTHROCYTE [DISTWIDTH] IN BLOOD BY AUTOMATED COUNT: 12.6 % (ref 12.3–15.4)
EXPIRATION DATE: ABNORMAL
EXPIRATION DATE: NORMAL
GLOBULIN UR ELPH-MCNC: 3.2 GM/DL
GLUCOSE SERPL-MCNC: 94 MG/DL (ref 65–99)
GLUCOSE UR STRIP-MCNC: NEGATIVE MG/DL
HBA1C MFR BLD: 5.1 % (ref 4.5–5.7)
HCT VFR BLD AUTO: 38.3 % (ref 34–46.6)
HGB BLD-MCNC: 12.7 G/DL (ref 12–15.9)
IRON 24H UR-MRATE: 94 MCG/DL (ref 37–145)
IRON SATN MFR SERPL: 27 % (ref 20–50)
KETONES UR QL: ABNORMAL
LEUKOCYTE EST, POC: NEGATIVE
Lab: ABNORMAL
Lab: NORMAL
MAGNESIUM SERPL-MCNC: 1.9 MG/DL (ref 1.6–2.6)
MCH RBC QN AUTO: 30.2 PG (ref 26.6–33)
MCHC RBC AUTO-ENTMCNC: 33.2 G/DL (ref 31.5–35.7)
MCV RBC AUTO: 91 FL (ref 79–97)
NITRITE UR-MCNC: NEGATIVE MG/ML
PH UR: 6 [PH] (ref 5–8)
PLATELET # BLD AUTO: 250 10*3/MM3 (ref 140–450)
PMV BLD AUTO: 10.5 FL (ref 6–12)
POTASSIUM SERPL-SCNC: 4.7 MMOL/L (ref 3.5–5.2)
PROT SERPL-MCNC: 7.1 G/DL (ref 6–8.5)
PROT UR STRIP-MCNC: ABNORMAL MG/DL
RBC # BLD AUTO: 4.21 10*6/MM3 (ref 3.77–5.28)
RBC # UR STRIP: NEGATIVE /UL
SODIUM SERPL-SCNC: 137 MMOL/L (ref 136–145)
SP GR UR: 1.02 (ref 1–1.03)
TIBC SERPL-MCNC: 352 MCG/DL (ref 298–536)
TRANSFERRIN SERPL-MCNC: 236 MG/DL (ref 200–360)
UROBILINOGEN UR QL: ABNORMAL
WBC NRBC COR # BLD AUTO: 7.98 10*3/MM3 (ref 3.4–10.8)

## 2025-08-26 PROCEDURE — 83540 ASSAY OF IRON: CPT

## 2025-08-26 PROCEDURE — 85027 COMPLETE CBC AUTOMATED: CPT

## 2025-08-26 PROCEDURE — 36415 COLL VENOUS BLD VENIPUNCTURE: CPT

## 2025-08-26 PROCEDURE — 82607 VITAMIN B-12: CPT

## 2025-08-26 PROCEDURE — 84466 ASSAY OF TRANSFERRIN: CPT

## 2025-08-26 PROCEDURE — 80053 COMPREHEN METABOLIC PANEL: CPT

## 2025-08-26 PROCEDURE — 83735 ASSAY OF MAGNESIUM: CPT

## 2025-08-26 RX ORDER — LABETALOL 200 MG/1
TABLET, FILM COATED ORAL EVERY 12 HOURS SCHEDULED
COMMUNITY

## 2025-08-26 RX ORDER — NYSTATIN 100000 [USP'U]/G
POWDER TOPICAL 3 TIMES DAILY
Qty: 180 G | Refills: 0 | Status: SHIPPED | OUTPATIENT
Start: 2025-08-26

## 2025-08-26 RX ORDER — METFORMIN HYDROCHLORIDE 500 MG/1
1000 TABLET, EXTENDED RELEASE ORAL
Qty: 60 TABLET | Refills: 0 | Status: SHIPPED | OUTPATIENT
Start: 2025-08-26

## 2025-08-26 RX ORDER — CETIRIZINE HYDROCHLORIDE 10 MG/1
10 TABLET ORAL DAILY
Qty: 30 TABLET | Refills: 0 | Status: SHIPPED | OUTPATIENT
Start: 2025-08-26

## 2025-08-26 RX ORDER — NYSTATIN 100000 U/G
1 CREAM TOPICAL 2 TIMES DAILY
Qty: 90 G | Refills: 0 | Status: SHIPPED | OUTPATIENT
Start: 2025-08-26

## 2025-08-27 ENCOUNTER — RESULTS FOLLOW-UP (OUTPATIENT)
Dept: FAMILY MEDICINE CLINIC | Facility: CLINIC | Age: 45
End: 2025-08-27
Payer: COMMERCIAL

## 2025-08-27 LAB — VIT B12 BLD-MCNC: 744 PG/ML (ref 211–946)
